# Patient Record
Sex: FEMALE | Race: WHITE | HISPANIC OR LATINO | Employment: UNEMPLOYED | URBAN - METROPOLITAN AREA
[De-identification: names, ages, dates, MRNs, and addresses within clinical notes are randomized per-mention and may not be internally consistent; named-entity substitution may affect disease eponyms.]

---

## 2024-06-19 ENCOUNTER — HOSPITAL ENCOUNTER (EMERGENCY)
Facility: HOSPITAL | Age: 24
Discharge: HOME | End: 2024-06-19
Attending: EMERGENCY MEDICINE
Payer: MEDICAID

## 2024-06-19 VITALS
RESPIRATION RATE: 18 BRPM | TEMPERATURE: 99 F | HEART RATE: 72 BPM | SYSTOLIC BLOOD PRESSURE: 126 MMHG | DIASTOLIC BLOOD PRESSURE: 77 MMHG | OXYGEN SATURATION: 100 %

## 2024-06-19 DIAGNOSIS — Z34.91 NORMAL IUP (INTRAUTERINE PREGNANCY) ON PRENATAL ULTRASOUND, FIRST TRIMESTER: Primary | ICD-10-CM

## 2024-06-19 DIAGNOSIS — R10.9 ABDOMINAL PAIN: ICD-10-CM

## 2024-06-19 LAB
ALBUMIN SERPL BCP-MCNC: 3.7 G/DL (ref 3.5–5.2)
ALP SERPL-CCNC: 100 U/L (ref 55–135)
ALT SERPL W/O P-5'-P-CCNC: 31 U/L (ref 10–44)
ANION GAP SERPL CALC-SCNC: 8 MMOL/L (ref 8–16)
AST SERPL-CCNC: 24 U/L (ref 10–40)
B-HCG UR QL: POSITIVE
BACTERIA #/AREA URNS HPF: ABNORMAL /HPF
BASOPHILS # BLD AUTO: 0.04 K/UL (ref 0–0.2)
BASOPHILS NFR BLD: 0.6 % (ref 0–1.9)
BILIRUB SERPL-MCNC: 0.7 MG/DL (ref 0.1–1)
BILIRUB UR QL STRIP: NEGATIVE
BUN SERPL-MCNC: 5 MG/DL (ref 6–20)
CALCIUM SERPL-MCNC: 9.5 MG/DL (ref 8.7–10.5)
CHLORIDE SERPL-SCNC: 106 MMOL/L (ref 95–110)
CLARITY UR: CLEAR
CO2 SERPL-SCNC: 21 MMOL/L (ref 23–29)
COLOR UR: YELLOW
CREAT SERPL-MCNC: 0.6 MG/DL (ref 0.5–1.4)
CTP QC/QA: YES
DIFFERENTIAL METHOD BLD: NORMAL
EOSINOPHIL # BLD AUTO: 0.1 K/UL (ref 0–0.5)
EOSINOPHIL NFR BLD: 1.3 % (ref 0–8)
ERYTHROCYTE [DISTWIDTH] IN BLOOD BY AUTOMATED COUNT: 12.7 % (ref 11.5–14.5)
EST. GFR  (NO RACE VARIABLE): >60 ML/MIN/1.73 M^2
GLUCOSE SERPL-MCNC: 81 MG/DL (ref 70–110)
GLUCOSE UR QL STRIP: NEGATIVE
HCT VFR BLD AUTO: 39.8 % (ref 37–48.5)
HGB BLD-MCNC: 14 G/DL (ref 12–16)
HGB UR QL STRIP: NEGATIVE
IMM GRANULOCYTES # BLD AUTO: 0.02 K/UL (ref 0–0.04)
IMM GRANULOCYTES NFR BLD AUTO: 0.3 % (ref 0–0.5)
KETONES UR QL STRIP: NEGATIVE
LEUKOCYTE ESTERASE UR QL STRIP: ABNORMAL
LIPASE SERPL-CCNC: 20 U/L (ref 4–60)
LYMPHOCYTES # BLD AUTO: 2.1 K/UL (ref 1–4.8)
LYMPHOCYTES NFR BLD: 31 % (ref 18–48)
MCH RBC QN AUTO: 30.5 PG (ref 27–31)
MCHC RBC AUTO-ENTMCNC: 35.2 G/DL (ref 32–36)
MCV RBC AUTO: 87 FL (ref 82–98)
MICROSCOPIC COMMENT: ABNORMAL
MONOCYTES # BLD AUTO: 0.4 K/UL (ref 0.3–1)
MONOCYTES NFR BLD: 6.6 % (ref 4–15)
NEUTROPHILS # BLD AUTO: 4 K/UL (ref 1.8–7.7)
NEUTROPHILS NFR BLD: 60.2 % (ref 38–73)
NITRITE UR QL STRIP: NEGATIVE
NRBC BLD-RTO: 0 /100 WBC
PH UR STRIP: 7 [PH] (ref 5–8)
PLATELET # BLD AUTO: 249 K/UL (ref 150–450)
PMV BLD AUTO: 9.6 FL (ref 9.2–12.9)
POTASSIUM SERPL-SCNC: 3.7 MMOL/L (ref 3.5–5.1)
PROT SERPL-MCNC: 7.6 G/DL (ref 6–8.4)
PROT UR QL STRIP: NEGATIVE
RBC # BLD AUTO: 4.59 M/UL (ref 4–5.4)
RBC #/AREA URNS HPF: 2 /HPF (ref 0–4)
SODIUM SERPL-SCNC: 135 MMOL/L (ref 136–145)
SP GR UR STRIP: 1.01 (ref 1–1.03)
SQUAMOUS #/AREA URNS HPF: 2 /HPF
URN SPEC COLLECT METH UR: ABNORMAL
UROBILINOGEN UR STRIP-ACNC: NEGATIVE EU/DL
WBC # BLD AUTO: 6.68 K/UL (ref 3.9–12.7)
WBC #/AREA URNS HPF: 9 /HPF (ref 0–5)

## 2024-06-19 PROCEDURE — 85025 COMPLETE CBC W/AUTO DIFF WBC: CPT

## 2024-06-19 PROCEDURE — 81000 URINALYSIS NONAUTO W/SCOPE: CPT

## 2024-06-19 PROCEDURE — 81025 URINE PREGNANCY TEST: CPT

## 2024-06-19 PROCEDURE — 99284 EMERGENCY DEPT VISIT MOD MDM: CPT | Mod: 25

## 2024-06-19 PROCEDURE — 83690 ASSAY OF LIPASE: CPT

## 2024-06-19 PROCEDURE — 80053 COMPREHEN METABOLIC PANEL: CPT

## 2024-06-19 NOTE — ED PROVIDER NOTES
Encounter Date: 2024       History     Chief Complaint   Patient presents with    Abdominal Pain     Patient c/o abdominal pain and nausea, states is pregnant - has not taken pregnancy test. No hx of pregancies.Nausea and pain started one week ago.     The patient is a 24-year-old female who came to the emergency department to see if she was pregnant.  She denies any complaints at this time but states she has some left lower quadrant pain from time to time.  No vaginal bleeding.  The patient is  and her last menstrual period was sometime in March.      Review of patient's allergies indicates:  No Known Allergies  History reviewed. No pertinent past medical history.  History reviewed. No pertinent surgical history.  No family history on file.     Review of Systems   All other systems reviewed and are negative.      Physical Exam     Initial Vitals   BP Pulse Resp Temp SpO2   24 1014 24 1014 24 1014 24 1019 24 1014   137/83 92 18 98.6 °F (37 °C) 100 %      MAP       --                Physical Exam    Nursing note and vitals reviewed.  Constitutional: She appears well-developed and well-nourished.   Neck: Neck supple.   Normal range of motion.  Abdominal: Abdomen is soft. She exhibits no distension. There is no abdominal tenderness.   Musculoskeletal:      Cervical back: Normal range of motion and neck supple.     Neurological: She is alert and oriented to person, place, and time.         ED Course   Procedures  Labs Reviewed   COMPREHENSIVE METABOLIC PANEL - Abnormal; Notable for the following components:       Result Value    Sodium 135 (*)     CO2 21 (*)     BUN 5 (*)     All other components within normal limits   URINALYSIS, REFLEX TO URINE CULTURE - Abnormal; Notable for the following components:    Leukocytes, UA 1+ (*)     All other components within normal limits    Narrative:     Specimen Source->Urine   URINALYSIS MICROSCOPIC - Abnormal; Notable for the following  components:    WBC, UA 9 (*)     All other components within normal limits    Narrative:     Specimen Source->Urine   POCT URINE PREGNANCY - Abnormal; Notable for the following components:    POC Preg Test, Ur Positive (*)     All other components within normal limits   CBC W/ AUTO DIFFERENTIAL   LIPASE          Imaging Results              US OB <14 Wks, TransAbd, Single Gestation (Final result)  Result time 06/19/24 13:02:20      Final result by Yasmani Reeder III, MD (06/19/24 13:02:20)                   Narrative:    EXAMINATION:  US OB <14 WEEKS TRANSABDOM, SINGLE GESTATION    CLINICAL HISTORY:  Unspecified abdominal pain    FINDINGS:  Uterus measures 12.4 x 4.9 x 9.1 cm.    Right ovary measures 2.5 x 1.5 x 1.9 cm.    Left ovary measures 3.6 x 1.8 x 2.6 cm.    Cervical length is 3.9 cm.    Mean sac diameter 54.6 mm.    Crown-rump length 69.0 mm.    Heart rate 144 beats per minute.    There is no yolk sac.    Placenta is posterior and low lying.    There is a single live intrauterine pregnancy 13 weeks 0 days without complication.  Delivery date is 12/25/2024.      Electronically signed by: Yasmani Reeder MD  Date:    06/19/2024  Time:    13:02                                     Medications - No data to display  Medical Decision Making  Differential Diagnosis includes, but is not limited to:  Pregnancy complication (threatened AB, inevitable AB, incomplete Ab, missed AB, ectopic pregnancy, ovarian cyst/torsion    MDM:  The patient is a 24-year-old female who has a positive pregnancy test today.  He denies any complaints at this time but states she has left lower quadrant abdominal pain from time to time. No vaginal bleeding.    Amount and/or Complexity of Data Reviewed  Labs: ordered.  Radiology: ordered. Decision-making details documented in ED Course.               ED Course as of 06/19/24 1606   Wed Jun 19, 2024   1311 US OB <14 Wks, TransAbd, Single Gestation  FINDINGS:  Uterus measures 12.4 x 4.9 x 9.1  cm.     Right ovary measures 2.5 x 1.5 x 1.9 cm.     Left ovary measures 3.6 x 1.8 x 2.6 cm.     Cervical length is 3.9 cm.     Mean sac diameter 54.6 mm.     Crown-rump length 69.0 mm.     Heart rate 144 beats per minute.     There is no yolk sac.     Placenta is posterior and low lying.     There is a single live intrauterine pregnancy 13 weeks 0 days without complication.  Delivery date is 12/25/2024.      [ST]      ED Course User Index  [ST] Devi Crawford MD                           Clinical Impression:  Final diagnoses:  [R10.9] Abdominal pain  [Z34.91] Normal IUP (intrauterine pregnancy) on prenatal ultrasound, first trimester (Primary)          ED Disposition Condition    Discharge Stable          ED Prescriptions    None       Follow-up Information    None          Devi Crawford MD  06/19/24 [id number]

## 2024-06-25 ENCOUNTER — OFFICE VISIT (OUTPATIENT)
Dept: OBSTETRICS AND GYNECOLOGY | Facility: CLINIC | Age: 24
End: 2024-06-25
Payer: MEDICAID

## 2024-06-25 ENCOUNTER — LAB VISIT (OUTPATIENT)
Dept: LAB | Facility: HOSPITAL | Age: 24
End: 2024-06-25
Attending: OBSTETRICS & GYNECOLOGY
Payer: MEDICAID

## 2024-06-25 DIAGNOSIS — Z34.91 NORMAL IUP (INTRAUTERINE PREGNANCY) ON PRENATAL ULTRASOUND, FIRST TRIMESTER: ICD-10-CM

## 2024-06-25 LAB
ABO + RH BLD: NORMAL
BASOPHILS # BLD AUTO: 0.03 K/UL (ref 0–0.2)
BASOPHILS NFR BLD: 0.4 % (ref 0–1.9)
BLD GP AB SCN CELLS X3 SERPL QL: NORMAL
DIFFERENTIAL METHOD BLD: NORMAL
EOSINOPHIL # BLD AUTO: 0.1 K/UL (ref 0–0.5)
EOSINOPHIL NFR BLD: 1.5 % (ref 0–8)
ERYTHROCYTE [DISTWIDTH] IN BLOOD BY AUTOMATED COUNT: 12.9 % (ref 11.5–14.5)
HBV SURFACE AG SERPL QL IA: NORMAL
HCT VFR BLD AUTO: 39.1 % (ref 37–48.5)
HCV AB SERPL QL IA: NORMAL
HGB BLD-MCNC: 13.8 G/DL (ref 12–16)
HIV 1+2 AB+HIV1 P24 AG SERPL QL IA: NORMAL
IMM GRANULOCYTES # BLD AUTO: 0.03 K/UL (ref 0–0.04)
IMM GRANULOCYTES NFR BLD AUTO: 0.4 % (ref 0–0.5)
LYMPHOCYTES # BLD AUTO: 2.1 K/UL (ref 1–4.8)
LYMPHOCYTES NFR BLD: 30.4 % (ref 18–48)
MCH RBC QN AUTO: 30.5 PG (ref 27–31)
MCHC RBC AUTO-ENTMCNC: 35.3 G/DL (ref 32–36)
MCV RBC AUTO: 86 FL (ref 82–98)
MONOCYTES # BLD AUTO: 0.4 K/UL (ref 0.3–1)
MONOCYTES NFR BLD: 6.2 % (ref 4–15)
NEUTROPHILS # BLD AUTO: 4.1 K/UL (ref 1.8–7.7)
NEUTROPHILS NFR BLD: 61.1 % (ref 38–73)
NRBC BLD-RTO: 0 /100 WBC
PLATELET # BLD AUTO: 246 K/UL (ref 150–450)
PMV BLD AUTO: 9.8 FL (ref 9.2–12.9)
RBC # BLD AUTO: 4.53 M/UL (ref 4–5.4)
TREPONEMA PALLIDUM IGG+IGM AB [PRESENCE] IN SERUM OR PLASMA BY IMMUNOASSAY: NONREACTIVE
WBC # BLD AUTO: 6.77 K/UL (ref 3.9–12.7)

## 2024-06-25 PROCEDURE — 86593 SYPHILIS TEST NON-TREP QUANT: CPT | Performed by: OBSTETRICS & GYNECOLOGY

## 2024-06-25 PROCEDURE — 86900 BLOOD TYPING SEROLOGIC ABO: CPT | Performed by: OBSTETRICS & GYNECOLOGY

## 2024-06-25 PROCEDURE — 85025 COMPLETE CBC W/AUTO DIFF WBC: CPT | Performed by: OBSTETRICS & GYNECOLOGY

## 2024-06-25 PROCEDURE — 86850 RBC ANTIBODY SCREEN: CPT | Performed by: OBSTETRICS & GYNECOLOGY

## 2024-06-25 PROCEDURE — 99999 PR PBB SHADOW E&M-EST. PATIENT-LVL II: CPT | Mod: PBBFAC,,, | Performed by: OBSTETRICS & GYNECOLOGY

## 2024-06-25 PROCEDURE — 86803 HEPATITIS C AB TEST: CPT | Performed by: OBSTETRICS & GYNECOLOGY

## 2024-06-25 PROCEDURE — 86762 RUBELLA ANTIBODY: CPT | Performed by: OBSTETRICS & GYNECOLOGY

## 2024-06-25 PROCEDURE — 88175 CYTOPATH C/V AUTO FLUID REDO: CPT | Performed by: OBSTETRICS & GYNECOLOGY

## 2024-06-25 PROCEDURE — 87389 HIV-1 AG W/HIV-1&-2 AB AG IA: CPT | Performed by: OBSTETRICS & GYNECOLOGY

## 2024-06-25 PROCEDURE — 36415 COLL VENOUS BLD VENIPUNCTURE: CPT | Performed by: OBSTETRICS & GYNECOLOGY

## 2024-06-25 PROCEDURE — 99212 OFFICE O/P EST SF 10 MIN: CPT | Mod: PBBFAC,TH,PO | Performed by: OBSTETRICS & GYNECOLOGY

## 2024-06-25 PROCEDURE — 87086 URINE CULTURE/COLONY COUNT: CPT | Performed by: OBSTETRICS & GYNECOLOGY

## 2024-06-25 PROCEDURE — 87491 CHLMYD TRACH DNA AMP PROBE: CPT | Performed by: OBSTETRICS & GYNECOLOGY

## 2024-06-25 PROCEDURE — 81514 NFCT DS BV&VAGINITIS DNA ALG: CPT | Performed by: OBSTETRICS & GYNECOLOGY

## 2024-06-25 PROCEDURE — 87340 HEPATITIS B SURFACE AG IA: CPT | Performed by: OBSTETRICS & GYNECOLOGY

## 2024-06-25 NOTE — PROGRESS NOTES
CC: 13 wk pregnancy    SUBJECTIVE:   24 y.o. female No obstetric history on file.  for annual routine Pap and checkup. No LMP recorded..  She has no unusual complaints.        History reviewed. No pertinent past medical history.  History reviewed. No pertinent surgical history.  Social History     Socioeconomic History    Marital status: Single   Tobacco Use    Smoking status: Never    Smokeless tobacco: Never     No family history on file.  OB History   No obstetric history on file.         No current outpatient medications on file.     No current facility-administered medications for this visit.     Allergies: Patient has no known allergies.     ROS:  Constitutional: no weight loss, weight gain, fever, fatigue  Eyes:  No vision changes, glasses/contacts  ENT/Mouth: No ulcers, sinus problems, ears ringing, headache  Cardiovascular: No inability to lie flat, chest pain, exercise intolerance, swelling, heart palpitations  Respiratory: No wheezing, coughing blood, shortness of breath, or cough  Gastrointestinal: No diarrhea, bloody stool, nausea/vomiting, constipation, gas, hemorrhoids  Genitourinary: No blood in urine, painful urination, urgency of urination, frequency of urination, incomplete emptying, incontinence, abnormal bleeding, painful periods, heavy periods, vaginal discharge, vaginal odor, painful intercourse, sexual problems, bleeding after intercourse.  Musculoskeletal: No muscle weakness  Skin/Breast: No painful breasts, nipple discharge, masses, rash, ulcers  Neurological: No passing out, seizures, numbness, headache  Endocrine: No diabetes, hypothyroid, hyperthyroid, hot flashes, hair loss, abnormal hair growth, ance  Psychiatric: No depression, crying  Hematologic: No bruises, bleeding, swollen lymph nodes, anemia.      OBJECTIVE:   The patient appears well, alert, oriented x 3, in no distress.  There were no vitals taken for this visit.  NECK: no thyromegaly, trachea midline  SKIN: no acne, striae,  hirsutism  BREAST EXAM: not examined  ABDOMEN: no hernias, masses, or hepatosplenomegaly  GENITALIA: normal external genitalia, no erythema, no discharge  URETHRA: normal urethra, normal urethral meatus  VAGINA: Normal  CERVIX: no lesions or cervical motion tenderness  UTERUS: enlarged, 14 weeks size  ADNEXA: normal adnexa and no mass, fullness, tenderness  US OB <14 WEEKS TRANSABDOM, SINGLE GESTATION     CLINICAL HISTORY:  Unspecified abdominal pain     FINDINGS:  Uterus measures 12.4 x 4.9 x 9.1 cm.     Right ovary measures 2.5 x 1.5 x 1.9 cm.     Left ovary measures 3.6 x 1.8 x 2.6 cm.     Cervical length is 3.9 cm.     Mean sac diameter 54.6 mm.     Crown-rump length 69.0 mm.     Heart rate 144 beats per minute.     There is no yolk sac.     Placenta is posterior and low lying.     There is a single live intrauterine pregnancy 13 weeks 0 days without complication.  Delivery date is 12/25/2024.        Electronically signed by:Yasmani Reeder MD  Date:                                            06/19/2024  Time:                                           13:02    ASSESSMENT:   well woman  1. Normal IUP (intrauterine pregnancy) on prenatal ultrasound, first trimester        PLAN:   pap smear  additional lab tests per orders  return 3wks for quad  Orders Placed This Encounter    Urine culture    Rubella Antibody, IgG    Hepatitis C Antibody    HIV 1/2 Ag/Ab (4th Gen)    Hepatitis B Surface Antigen    CBC Auto Differential    Treponema Pallidium Antibodies IgG, IgM    Vaginosis Screen by DNA Probe    C. trachomatis/N. gonorrhoeae by AMP DNA [facility]; Cervix    POCT urine pregnancy    Type & Screen - Ob Profile    Liquid-Based Pap Smear, Screening

## 2024-06-26 LAB
C TRACH DNA SPEC QL NAA+PROBE: NOT DETECTED
CLINICAL INFO: NORMAL
DATE OF PREVIOUS PAP: NORMAL
DATE PREVIOUS BX: NORMAL
LMP START DATE: NORMAL
N GONORRHOEA DNA SPEC QL NAA+PROBE: NOT DETECTED
RUBV IGG SER-ACNC: <5 IU/ML
RUBV IGG SER-IMP: ABNORMAL
SPECIMEN SOURCE CVX/VAG CYTO: NORMAL

## 2024-06-27 LAB
BACTERIA UR CULT: NORMAL
BACTERIA UR CULT: NORMAL
BACTERIAL VAGINOSIS DNA: NEGATIVE
CANDIDA GLABRATA DNA: NEGATIVE
CANDIDA KRUSEI DNA: NEGATIVE
CANDIDA RRNA VAG QL PROBE: NEGATIVE
T VAGINALIS RRNA GENITAL QL PROBE: NEGATIVE

## 2024-07-23 ENCOUNTER — TELEPHONE (OUTPATIENT)
Dept: OBSTETRICS AND GYNECOLOGY | Facility: CLINIC | Age: 24
End: 2024-07-23
Payer: MEDICAID

## 2024-07-23 ENCOUNTER — ROUTINE PRENATAL (OUTPATIENT)
Dept: OBSTETRICS AND GYNECOLOGY | Facility: CLINIC | Age: 24
End: 2024-07-23
Payer: MEDICAID

## 2024-07-23 VITALS — WEIGHT: 116.88 LBS | DIASTOLIC BLOOD PRESSURE: 76 MMHG | SYSTOLIC BLOOD PRESSURE: 110 MMHG

## 2024-07-23 DIAGNOSIS — Z3A.17 17 WEEKS GESTATION OF PREGNANCY: Primary | ICD-10-CM

## 2024-07-23 PROCEDURE — 99211 OFF/OP EST MAY X REQ PHY/QHP: CPT | Mod: PBBFAC,PO | Performed by: OBSTETRICS & GYNECOLOGY

## 2024-07-23 PROCEDURE — 99999 PR PBB SHADOW E&M-EST. PATIENT-LVL I: CPT | Mod: PBBFAC,,, | Performed by: OBSTETRICS & GYNECOLOGY

## 2024-07-23 NOTE — PROGRESS NOTES
CC: Routine OB    SUBJECTIVE:   24 y.o. female   for routine OB. No LMP recorded. Patient is pregnant..  She has no unusual complaints. Denies vaginal bleeding or cramping.    FHT 140s      History reviewed. No pertinent past medical history.  History reviewed. No pertinent surgical history.  Social History     Socioeconomic History    Marital status: Single   Tobacco Use    Smoking status: Never    Smokeless tobacco: Never   Substance and Sexual Activity    Alcohol use: Not Currently    Drug use: Not Currently    Sexual activity: Yes     Partners: Male     No family history on file.  OB History    Para Term  AB Living   1             SAB IAB Ectopic Multiple Live Births                  # Outcome Date GA Lbr Roney/2nd Weight Sex Type Anes PTL Lv   1 Current                  No current outpatient medications on file.     No current facility-administered medications for this visit.     Allergies: Patient has no known allergies.     ROS:  Constitutional: no weight loss, weight gain, fever, fatigue  Eyes:  No vision changes, glasses/contacts  ENT/Mouth: No ulcers, sinus problems, ears ringing, headache  Cardiovascular: No inability to lie flat, chest pain, exercise intolerance, swelling, heart palpitations  Respiratory: No wheezing, coughing blood, shortness of breath, or cough  Gastrointestinal: No diarrhea, bloody stool, nausea/vomiting, constipation, gas, hemorrhoids  Genitourinary: No blood in urine, painful urination, urgency of urination, frequency of urination, incomplete emptying, incontinence, abnormal bleeding, painful periods, heavy periods, vaginal discharge, vaginal odor, painful intercourse, sexual problems, bleeding after intercourse.  Musculoskeletal: No muscle weakness  Skin/Breast: No painful breasts, nipple discharge, masses, rash, ulcers  Neurological: No passing out, seizures, numbness, headache  Endocrine: No diabetes, hypothyroid, hyperthyroid, hot flashes, hair loss, abnormal  hair growth, ance  Psychiatric: No depression, crying  Hematologic: No bruises, bleeding, swollen lymph nodes, anemia.      OBJECTIVE:   The patient appears well, alert, oriented x 3, in no distress.  /76   Wt 53 kg (116 lb 13.5 oz)   Physical Exam  Constitutional:       General: She is not in acute distress.     Appearance: Normal appearance. She is not ill-appearing.   HENT:      Head: Normocephalic and atraumatic.   Eyes:      General:         Right eye: No discharge.         Left eye: No discharge.      Conjunctiva/sclera: Conjunctivae normal.   Cardiovascular:      Rate and Rhythm: Normal rate.      Pulses: Normal pulses.   Pulmonary:      Effort: Pulmonary effort is normal. No respiratory distress.   Abdominal:      Tenderness: There is no abdominal tenderness. There is no guarding or rebound.      Comments: Gravid abdomen   Musculoskeletal:      Right lower leg: No edema.      Left lower leg: No edema.   Skin:     General: Skin is warm.      Coloration: Skin is not jaundiced.   Neurological:      Mental Status: She is alert and oriented to person, place, and time.      Gait: Gait normal.   Psychiatric:         Mood and Affect: Mood normal.         Behavior: Behavior normal.             ASSESSMENT:   1. 17 weeks gestation of pregnancy        PLAN:   Anatomy scan & maternal quad ordered. RTC in 4 weeks  Vitals signs, FHTs, urine dip, and PE findings documented, reviewed and available in OB flow chart.   Orders Placed This Encounter    Maternal Quad Screen    US OB/GYN Procedure ([facility])-Future

## 2024-08-06 ENCOUNTER — PROCEDURE VISIT (OUTPATIENT)
Dept: MATERNAL FETAL MEDICINE | Facility: CLINIC | Age: 24
End: 2024-08-06
Payer: MEDICAID

## 2024-08-06 DIAGNOSIS — Z3A.17 17 WEEKS GESTATION OF PREGNANCY: ICD-10-CM

## 2024-08-06 PROCEDURE — 76805 OB US >/= 14 WKS SNGL FETUS: CPT | Mod: PBBFAC,PO | Performed by: OBSTETRICS & GYNECOLOGY

## 2024-08-20 ENCOUNTER — ROUTINE PRENATAL (OUTPATIENT)
Dept: OBSTETRICS AND GYNECOLOGY | Facility: CLINIC | Age: 24
End: 2024-08-20
Payer: MEDICAID

## 2024-08-20 VITALS — SYSTOLIC BLOOD PRESSURE: 112 MMHG | WEIGHT: 121.06 LBS | DIASTOLIC BLOOD PRESSURE: 74 MMHG

## 2024-08-20 DIAGNOSIS — Z3A.21 21 WEEKS GESTATION OF PREGNANCY: Primary | ICD-10-CM

## 2024-08-20 PROCEDURE — 99999 PR PBB SHADOW E&M-EST. PATIENT-LVL II: CPT | Mod: PBBFAC,,, | Performed by: OBSTETRICS & GYNECOLOGY

## 2024-08-20 PROCEDURE — 99212 OFFICE O/P EST SF 10 MIN: CPT | Mod: PBBFAC,PO | Performed by: OBSTETRICS & GYNECOLOGY

## 2024-08-20 NOTE — PROGRESS NOTES
Patient with no complaints. Denies vaginal bleeding or cramping.  Good FM. FHTs 130's. Discussed with patient having glucose testing for gestational diabetes preformed between 24-28 weeks. RTC in 4 weeks.   Consents signed today with   Vitals signs, FHTs, urine dip, and PE findings documented, reviewed and available in OB flow chart.       I spent a total of 20 minutes on the day of the visit.This includes face to face time and non-face to face time preparing to see the patient (eg, review of tests), Obtaining and/or reviewing separately obtained history, Documenting clinical information in the electronic or other health record, Independently interpreting resultsand communicating results to the patient/family/caregiver, or Care coordination.

## 2024-09-24 ENCOUNTER — ROUTINE PRENATAL (OUTPATIENT)
Dept: OBSTETRICS AND GYNECOLOGY | Facility: CLINIC | Age: 24
End: 2024-09-24
Payer: MEDICAID

## 2024-09-24 VITALS — DIASTOLIC BLOOD PRESSURE: 71 MMHG | WEIGHT: 124.56 LBS | SYSTOLIC BLOOD PRESSURE: 111 MMHG

## 2024-09-24 DIAGNOSIS — Z3A.26 26 WEEKS GESTATION OF PREGNANCY: Primary | ICD-10-CM

## 2024-09-24 PROCEDURE — 99999 PR PBB SHADOW E&M-EST. PATIENT-LVL II: CPT | Mod: PBBFAC,,, | Performed by: OBSTETRICS & GYNECOLOGY

## 2024-09-24 PROCEDURE — 99212 OFFICE O/P EST SF 10 MIN: CPT | Mod: PBBFAC,PO | Performed by: OBSTETRICS & GYNECOLOGY

## 2024-09-24 PROCEDURE — 99213 OFFICE O/P EST LOW 20 MIN: CPT | Mod: TH,S$PBB,, | Performed by: OBSTETRICS & GYNECOLOGY

## 2024-09-24 NOTE — PROGRESS NOTES
Patient with no complaints. Denies vaginal bleeding or contractions. Good FM. GTT/ ordered today.   labor precautions discussed with patient. RTC in 2 weeks.   Fht's 140-50s  Vitals signs, FHTs, urine dip, and PE findings documented, reviewed and available in OB flow chart.       I spent a total of 20 minutes on the day of the visit.This includes face to face time and non-face to face time preparing to see the patient (eg, review of tests), Obtaining and/or reviewing separately obtained history, Documenting clinical information in the electronic or other health record, Independently interpreting resultsand communicating results to the patient/family/caregiver, or Care coordination.

## 2024-10-22 ENCOUNTER — LAB VISIT (OUTPATIENT)
Dept: LAB | Facility: HOSPITAL | Age: 24
End: 2024-10-22
Attending: OBSTETRICS & GYNECOLOGY
Payer: MEDICAID

## 2024-10-22 ENCOUNTER — ROUTINE PRENATAL (OUTPATIENT)
Dept: OBSTETRICS AND GYNECOLOGY | Facility: CLINIC | Age: 24
End: 2024-10-22
Payer: MEDICAID

## 2024-10-22 VITALS — DIASTOLIC BLOOD PRESSURE: 72 MMHG | SYSTOLIC BLOOD PRESSURE: 118 MMHG | WEIGHT: 127 LBS

## 2024-10-22 DIAGNOSIS — Z3A.30 30 WEEKS GESTATION OF PREGNANCY: ICD-10-CM

## 2024-10-22 DIAGNOSIS — Z3A.30 30 WEEKS GESTATION OF PREGNANCY: Primary | ICD-10-CM

## 2024-10-22 DIAGNOSIS — Z3A.26 26 WEEKS GESTATION OF PREGNANCY: ICD-10-CM

## 2024-10-22 LAB — GLUCOSE SERPL-MCNC: 117 MG/DL (ref 70–140)

## 2024-10-22 PROCEDURE — 99212 OFFICE O/P EST SF 10 MIN: CPT | Mod: PBBFAC,PO | Performed by: OBSTETRICS & GYNECOLOGY

## 2024-10-22 PROCEDURE — 82950 GLUCOSE TEST: CPT | Performed by: OBSTETRICS & GYNECOLOGY

## 2024-10-22 PROCEDURE — 36415 COLL VENOUS BLD VENIPUNCTURE: CPT | Performed by: OBSTETRICS & GYNECOLOGY

## 2024-10-22 PROCEDURE — 86593 SYPHILIS TEST NON-TREP QUANT: CPT | Performed by: OBSTETRICS & GYNECOLOGY

## 2024-10-22 PROCEDURE — 87389 HIV-1 AG W/HIV-1&-2 AB AG IA: CPT | Performed by: OBSTETRICS & GYNECOLOGY

## 2024-10-22 PROCEDURE — 99999 PR PBB SHADOW E&M-EST. PATIENT-LVL II: CPT | Mod: PBBFAC,,, | Performed by: OBSTETRICS & GYNECOLOGY

## 2024-10-22 NOTE — PROGRESS NOTES
Patient with lower abd pains worse with mvmt Denies vaginal bleeding or contractions. Good FM. RTC in 4 weeks.   Cx closed  Discussed abd binder for RLP  Needs to do dm screen and T3  labs today  Will walk pt to lab to get test done  Vitals signs, FHTs, urine dip, and PE findings documented, reviewed and available in OB flow chart.       I spent a total of 20 minutes on the day of the visit.This includes face to face time and non-face to face time preparing to see the patient (eg, review of tests), Obtaining and/or reviewing separately obtained history, Documenting clinical information in the electronic or other health record, Independently interpreting resultsand communicating results to the patient/family/caregiver, or Care coordination.

## 2024-10-23 LAB
HIV 1+2 AB+HIV1 P24 AG SERPL QL IA: NORMAL
TREPONEMA PALLIDUM IGG+IGM AB [PRESENCE] IN SERUM OR PLASMA BY IMMUNOASSAY: NONREACTIVE

## 2024-11-19 ENCOUNTER — ROUTINE PRENATAL (OUTPATIENT)
Dept: OBSTETRICS AND GYNECOLOGY | Facility: CLINIC | Age: 24
End: 2024-11-19
Payer: COMMERCIAL

## 2024-11-19 VITALS — SYSTOLIC BLOOD PRESSURE: 129 MMHG | DIASTOLIC BLOOD PRESSURE: 77 MMHG | WEIGHT: 132.69 LBS

## 2024-11-19 DIAGNOSIS — Z3A.34 34 WEEKS GESTATION OF PREGNANCY: Primary | ICD-10-CM

## 2024-11-19 PROCEDURE — 99999 PR PBB SHADOW E&M-EST. PATIENT-LVL II: CPT | Mod: PBBFAC,,, | Performed by: OBSTETRICS & GYNECOLOGY

## 2024-11-19 NOTE — PROGRESS NOTES
Patient with no complaints. Denies vaginal bleeding or contractions. Good FM. RTC in 2 weeks.   Fht's 140's  Consents done  Vitals signs, FHTs, urine dip, and PE findings documented, reviewed and available in OB flow chart.       I spent a total of 20 minutes on the day of the visit.This includes face to face time and non-face to face time preparing to see the patient (eg, review of tests), Obtaining and/or reviewing separately obtained history, Documenting clinical information in the electronic or other health record, Independently interpreting resultsand communicating results to the patient/family/caregiver, or Care coordination.

## 2024-11-26 ENCOUNTER — ROUTINE PRENATAL (OUTPATIENT)
Dept: OBSTETRICS AND GYNECOLOGY | Facility: CLINIC | Age: 24
End: 2024-11-26
Payer: MEDICAID

## 2024-11-26 VITALS — DIASTOLIC BLOOD PRESSURE: 88 MMHG | SYSTOLIC BLOOD PRESSURE: 130 MMHG | WEIGHT: 135.81 LBS

## 2024-11-26 DIAGNOSIS — Z3A.35 35 WEEKS GESTATION OF PREGNANCY: Primary | ICD-10-CM

## 2024-11-26 PROCEDURE — 99999 PR PBB SHADOW E&M-EST. PATIENT-LVL II: CPT | Mod: PBBFAC,,, | Performed by: OBSTETRICS & GYNECOLOGY

## 2024-11-26 PROCEDURE — 87653 STREP B DNA AMP PROBE: CPT | Performed by: OBSTETRICS & GYNECOLOGY

## 2024-11-26 PROCEDURE — 99212 OFFICE O/P EST SF 10 MIN: CPT | Mod: PBBFAC,PO | Performed by: OBSTETRICS & GYNECOLOGY

## 2024-11-26 NOTE — PROGRESS NOTES
Patient with no complaints. Denies vaginal bleeding or contractions. Good FM. GBS collected today. Labor precautions discused with patient. RTC in 1 week.   Fht's 130-40's  Cx closed 50 -3  Vitals signs, FHTs, urine dip, and PE findings documented, reviewed and available in OB flow chart.       I spent a total of 20 minutes on the day of the visit.This includes face to face time and non-face to face time preparing to see the patient (eg, review of tests), Obtaining and/or reviewing separately obtained history, Documenting clinical information in the electronic or other health record, Independently interpreting resultsand communicating results to the patient/family/caregiver, or Care coordination.

## 2024-11-28 LAB — GROUP B STREPTOCOCCUS, PCR: NEGATIVE

## 2024-12-03 ENCOUNTER — ROUTINE PRENATAL (OUTPATIENT)
Dept: OBSTETRICS AND GYNECOLOGY | Facility: CLINIC | Age: 24
End: 2024-12-03
Payer: MEDICAID

## 2024-12-03 VITALS — SYSTOLIC BLOOD PRESSURE: 128 MMHG | WEIGHT: 137.56 LBS | DIASTOLIC BLOOD PRESSURE: 86 MMHG

## 2024-12-03 DIAGNOSIS — Z3A.36 36 WEEKS GESTATION OF PREGNANCY: Primary | ICD-10-CM

## 2024-12-03 PROCEDURE — 99212 OFFICE O/P EST SF 10 MIN: CPT | Mod: PBBFAC,PO | Performed by: OBSTETRICS & GYNECOLOGY

## 2024-12-03 PROCEDURE — 99999 PR PBB SHADOW E&M-EST. PATIENT-LVL II: CPT | Mod: PBBFAC,,, | Performed by: OBSTETRICS & GYNECOLOGY

## 2024-12-03 NOTE — PROGRESS NOTES
Patient with no complaints. Denies vaginal bleeding or contractions. Good FM. GBS negative . Labor precautions discused with patient. RTC in 1 week.   Fht's 140's  Cx LTC  Vitals signs, FHTs, urine dip, and PE findings documented, reviewed and available in OB flow chart.       I spent a total of 20 minutes on the day of the visit.This includes face to face time and non-face to face time preparing to see the patient (eg, review of tests), Obtaining and/or reviewing separately obtained history, Documenting clinical information in the electronic or other health record, Independently interpreting resultsand communicating results to the patient/family/caregiver, or Care coordination.

## 2024-12-10 ENCOUNTER — ROUTINE PRENATAL (OUTPATIENT)
Dept: OBSTETRICS AND GYNECOLOGY | Facility: CLINIC | Age: 24
End: 2024-12-10
Payer: MEDICAID

## 2024-12-10 VITALS — WEIGHT: 138.25 LBS | DIASTOLIC BLOOD PRESSURE: 82 MMHG | SYSTOLIC BLOOD PRESSURE: 135 MMHG

## 2024-12-10 DIAGNOSIS — Z3A.37 37 WEEKS GESTATION OF PREGNANCY: Primary | ICD-10-CM

## 2024-12-10 PROCEDURE — 99212 OFFICE O/P EST SF 10 MIN: CPT | Mod: PBBFAC,PO | Performed by: OBSTETRICS & GYNECOLOGY

## 2024-12-10 PROCEDURE — 99999 PR PBB SHADOW E&M-EST. PATIENT-LVL II: CPT | Mod: PBBFAC,,, | Performed by: OBSTETRICS & GYNECOLOGY

## 2024-12-10 PROCEDURE — 84156 ASSAY OF PROTEIN URINE: CPT | Performed by: OBSTETRICS & GYNECOLOGY

## 2024-12-10 NOTE — PROGRESS NOTES
Patient with no complaints. Denies vaginal bleeding or contractions. Good FM. RTC in 1 week.   Fht's 130-40's  Cx LTC  P/c sent  Labor, rom, vb. Pre e precautions, FMC bid  Vitals signs, FHTs, urine dip, and PE findings documented, reviewed and available in OB flow chart.       I spent a total of 20 minutes on the day of the visit.This includes face to face time and non-face to face time preparing to see the patient (eg, review of tests), Obtaining and/or reviewing separately obtained history, Documenting clinical information in the electronic or other health record, Independently interpreting resultsand communicating results to the patient/family/caregiver, or Care coordination.

## 2024-12-11 ENCOUNTER — TELEPHONE (OUTPATIENT)
Dept: OBSTETRICS AND GYNECOLOGY | Facility: HOSPITAL | Age: 24
End: 2024-12-11
Payer: MEDICAID

## 2024-12-11 LAB
CREAT UR-MCNC: 48 MG/DL (ref 15–325)
PROT UR-MCNC: 37 MG/DL (ref 0–15)
PROT/CREAT UR: 0.77 MG/G{CREAT} (ref 0–0.2)

## 2024-12-11 NOTE — PROGRESS NOTES
Pt called via  [facility] #[id number] , pt called at 143pm and advised to come to labor and delivery for further evaluation, due to previous lab values. PCR of 0.7.  Pt told she could possible stay for delivery depending on evaluation and results. Pt states understanding to  and will come to labor and delivery nancy

## 2024-12-12 ENCOUNTER — TELEPHONE (OUTPATIENT)
Dept: OBSTETRICS AND GYNECOLOGY | Facility: CLINIC | Age: 24
End: 2024-12-12
Payer: MEDICAID

## 2024-12-12 NOTE — TELEPHONE ENCOUNTER
Called pt 2x with  #[phone] recording picked up both times. Couldn't leave voicemail because voicemail not set up

## 2024-12-12 NOTE — TELEPHONE ENCOUNTER
Please have pt report to L&D asap for BP series and evaluation for pre eclampsia  L&D spoke to pt yesterday and tried to get her in but she never showed up  This is urgent

## 2024-12-12 NOTE — TELEPHONE ENCOUNTER
Have been trying to contact pt w/o success to go to L&D for eval for pre e  Nurse spoke to pt via  yesterday but pt never came to L&D as instructed.  Instruvcted staff on L&D and clinic to cont to try reaching pt to go to L&D

## 2024-12-13 ENCOUNTER — TELEPHONE (OUTPATIENT)
Dept: OBSTETRICS AND GYNECOLOGY | Facility: CLINIC | Age: 24
End: 2024-12-13
Payer: MEDICAID

## 2024-12-13 ENCOUNTER — ANESTHESIA EVENT (OUTPATIENT)
Dept: OBSTETRICS AND GYNECOLOGY | Facility: HOSPITAL | Age: 24
End: 2024-12-13
Payer: MEDICAID

## 2024-12-13 ENCOUNTER — HOSPITAL ENCOUNTER (INPATIENT)
Facility: HOSPITAL | Age: 24
LOS: 4 days | Discharge: HOME | End: 2024-12-17
Attending: OBSTETRICS & GYNECOLOGY | Admitting: OBSTETRICS & GYNECOLOGY
Payer: MEDICAID

## 2024-12-13 ENCOUNTER — ANESTHESIA (OUTPATIENT)
Dept: OBSTETRICS AND GYNECOLOGY | Facility: HOSPITAL | Age: 24
End: 2024-12-13
Payer: MEDICAID

## 2024-12-13 DIAGNOSIS — O14.93 PRE-ECLAMPSIA IN THIRD TRIMESTER: Primary | ICD-10-CM

## 2024-12-13 DIAGNOSIS — Z34.90 PREGNANCY: ICD-10-CM

## 2024-12-13 LAB
ABO GROUP BLD: NORMAL
ALBUMIN SERPL BCP-MCNC: 2.5 G/DL (ref 3.5–5.2)
ALP SERPL-CCNC: 428 U/L (ref 40–150)
ALT SERPL W/O P-5'-P-CCNC: 57 U/L (ref 10–44)
ANION GAP SERPL CALC-SCNC: 9 MMOL/L (ref 8–16)
AST SERPL-CCNC: 50 U/L (ref 10–40)
BASOPHILS # BLD AUTO: 0.04 K/UL (ref 0–0.2)
BASOPHILS # BLD AUTO: 0.04 K/UL (ref 0–0.2)
BASOPHILS NFR BLD: 0.8 % (ref 0–1.9)
BASOPHILS NFR BLD: 0.9 % (ref 0–1.9)
BILIRUB SERPL-MCNC: 0.7 MG/DL (ref 0.1–1)
BLD GP AB SCN CELLS X3 SERPL QL: NORMAL
BUN SERPL-MCNC: 8 MG/DL (ref 6–20)
CALCIUM SERPL-MCNC: 9 MG/DL (ref 8.7–10.5)
CHLORIDE SERPL-SCNC: 110 MMOL/L (ref 95–110)
CO2 SERPL-SCNC: 19 MMOL/L (ref 23–29)
CREAT SERPL-MCNC: 0.7 MG/DL (ref 0.5–1.4)
CREAT UR-MCNC: 14.6 MG/DL (ref 15–325)
DIFFERENTIAL METHOD BLD: ABNORMAL
DIFFERENTIAL METHOD BLD: ABNORMAL
EOSINOPHIL # BLD AUTO: 0 K/UL (ref 0–0.5)
EOSINOPHIL # BLD AUTO: 0 K/UL (ref 0–0.5)
EOSINOPHIL NFR BLD: 0.6 % (ref 0–8)
EOSINOPHIL NFR BLD: 0.9 % (ref 0–8)
ERYTHROCYTE [DISTWIDTH] IN BLOOD BY AUTOMATED COUNT: 15.4 % (ref 11.5–14.5)
ERYTHROCYTE [DISTWIDTH] IN BLOOD BY AUTOMATED COUNT: 15.5 % (ref 11.5–14.5)
EST. GFR  (NO RACE VARIABLE): >60 ML/MIN/1.73 M^2
GLUCOSE SERPL-MCNC: 110 MG/DL (ref 70–110)
HBV SURFACE AG SERPL QL IA: NORMAL
HCT VFR BLD AUTO: 35.4 % (ref 37–48.5)
HCT VFR BLD AUTO: 36.6 % (ref 37–48.5)
HGB BLD-MCNC: 11.5 G/DL (ref 12–16)
HGB BLD-MCNC: 11.9 G/DL (ref 12–16)
HIV 1+2 AB+HIV1 P24 AG SERPL QL IA: NORMAL
IMM GRANULOCYTES # BLD AUTO: 0.02 K/UL (ref 0–0.04)
IMM GRANULOCYTES # BLD AUTO: 0.03 K/UL (ref 0–0.04)
IMM GRANULOCYTES NFR BLD AUTO: 0.4 % (ref 0–0.5)
IMM GRANULOCYTES NFR BLD AUTO: 0.6 % (ref 0–0.5)
LYMPHOCYTES # BLD AUTO: 1.6 K/UL (ref 1–4.8)
LYMPHOCYTES # BLD AUTO: 1.7 K/UL (ref 1–4.8)
LYMPHOCYTES NFR BLD: 36.2 % (ref 18–48)
LYMPHOCYTES NFR BLD: 36.4 % (ref 18–48)
MCH RBC QN AUTO: 26.6 PG (ref 27–31)
MCH RBC QN AUTO: 26.7 PG (ref 27–31)
MCHC RBC AUTO-ENTMCNC: 32.5 G/DL (ref 32–36)
MCHC RBC AUTO-ENTMCNC: 32.5 G/DL (ref 32–36)
MCV RBC AUTO: 82 FL (ref 82–98)
MCV RBC AUTO: 82 FL (ref 82–98)
MONOCYTES # BLD AUTO: 0.3 K/UL (ref 0.3–1)
MONOCYTES # BLD AUTO: 0.3 K/UL (ref 0.3–1)
MONOCYTES NFR BLD: 6.9 % (ref 4–15)
MONOCYTES NFR BLD: 7.2 % (ref 4–15)
NEUTROPHILS # BLD AUTO: 2.5 K/UL (ref 1.8–7.7)
NEUTROPHILS # BLD AUTO: 2.6 K/UL (ref 1.8–7.7)
NEUTROPHILS NFR BLD: 54.4 % (ref 38–73)
NEUTROPHILS NFR BLD: 54.7 % (ref 38–73)
NRBC BLD-RTO: 0 /100 WBC
NRBC BLD-RTO: 0 /100 WBC
PLATELET # BLD AUTO: 237 K/UL (ref 150–450)
PLATELET # BLD AUTO: 261 K/UL (ref 150–450)
PMV BLD AUTO: 10.1 FL (ref 9.2–12.9)
PMV BLD AUTO: 9.9 FL (ref 9.2–12.9)
POTASSIUM SERPL-SCNC: 3.6 MMOL/L (ref 3.5–5.1)
PROT SERPL-MCNC: 7.2 G/DL (ref 6–8.4)
PROT UR-MCNC: 20 MG/DL (ref 0–15)
PROT/CREAT UR: 1.37 MG/G{CREAT} (ref 0–0.2)
RBC # BLD AUTO: 4.31 M/UL (ref 4–5.4)
RBC # BLD AUTO: 4.48 M/UL (ref 4–5.4)
RH BLD: NORMAL
SODIUM SERPL-SCNC: 138 MMOL/L (ref 136–145)
TREPONEMA PALLIDUM IGG+IGM AB [PRESENCE] IN SERUM OR PLASMA BY IMMUNOASSAY: NONREACTIVE
WBC # BLD AUTO: 4.48 K/UL (ref 3.9–12.7)
WBC # BLD AUTO: 4.75 K/UL (ref 3.9–12.7)

## 2024-12-13 PROCEDURE — 59409 OBSTETRICAL CARE: CPT | Mod: QY,ANES,, | Performed by: STUDENT IN AN ORGANIZED HEALTH CARE EDUCATION/TRAINING PROGRAM

## 2024-12-13 PROCEDURE — 87389 HIV-1 AG W/HIV-1&-2 AB AG IA: CPT | Performed by: OBSTETRICS & GYNECOLOGY

## 2024-12-13 PROCEDURE — 59409 OBSTETRICAL CARE: CPT | Mod: QX,CRNA,, | Performed by: NURSE ANESTHETIST, CERTIFIED REGISTERED

## 2024-12-13 PROCEDURE — 87340 HEPATITIS B SURFACE AG IA: CPT | Performed by: OBSTETRICS & GYNECOLOGY

## 2024-12-13 PROCEDURE — 72100002 HC LABOR CARE, 1ST 8 HOURS

## 2024-12-13 PROCEDURE — 59025 FETAL NON-STRESS TEST: CPT

## 2024-12-13 PROCEDURE — 25000003 PHARM REV CODE 250: Performed by: NURSE ANESTHETIST, CERTIFIED REGISTERED

## 2024-12-13 PROCEDURE — 25000003 PHARM REV CODE 250: Performed by: OBSTETRICS & GYNECOLOGY

## 2024-12-13 PROCEDURE — 99211 OFF/OP EST MAY X REQ PHY/QHP: CPT

## 2024-12-13 PROCEDURE — 62326 NJX INTERLAMINAR LMBR/SAC: CPT | Performed by: NURSE ANESTHETIST, CERTIFIED REGISTERED

## 2024-12-13 PROCEDURE — 86901 BLOOD TYPING SEROLOGIC RH(D): CPT | Performed by: OBSTETRICS & GYNECOLOGY

## 2024-12-13 PROCEDURE — 80053 COMPREHEN METABOLIC PANEL: CPT | Performed by: OBSTETRICS & GYNECOLOGY

## 2024-12-13 PROCEDURE — 11000001 HC ACUTE MED/SURG PRIVATE ROOM

## 2024-12-13 PROCEDURE — 85025 COMPLETE CBC W/AUTO DIFF WBC: CPT | Performed by: OBSTETRICS & GYNECOLOGY

## 2024-12-13 PROCEDURE — 27200710 HC EPIDURAL INFUSION PUMP SET: Performed by: STUDENT IN AN ORGANIZED HEALTH CARE EDUCATION/TRAINING PROGRAM

## 2024-12-13 PROCEDURE — 82570 ASSAY OF URINE CREATININE: CPT | Performed by: OBSTETRICS & GYNECOLOGY

## 2024-12-13 PROCEDURE — 86593 SYPHILIS TEST NON-TREP QUANT: CPT | Performed by: OBSTETRICS & GYNECOLOGY

## 2024-12-13 PROCEDURE — 36415 COLL VENOUS BLD VENIPUNCTURE: CPT | Performed by: OBSTETRICS & GYNECOLOGY

## 2024-12-13 PROCEDURE — C1751 CATH, INF, PER/CENT/MIDLINE: HCPCS | Performed by: STUDENT IN AN ORGANIZED HEALTH CARE EDUCATION/TRAINING PROGRAM

## 2024-12-13 PROCEDURE — 51702 INSERT TEMP BLADDER CATH: CPT

## 2024-12-13 PROCEDURE — 63600175 PHARM REV CODE 636 W HCPCS: Performed by: OBSTETRICS & GYNECOLOGY

## 2024-12-13 RX ORDER — CARBOPROST TROMETHAMINE 250 UG/ML
250 INJECTION, SOLUTION INTRAMUSCULAR
Status: DISCONTINUED | OUTPATIENT
Start: 2024-12-13 | End: 2024-12-15 | Stop reason: SDUPTHER

## 2024-12-13 RX ORDER — OXYTOCIN 10 [USP'U]/ML
10 INJECTION, SOLUTION INTRAMUSCULAR; INTRAVENOUS ONCE AS NEEDED
Status: DISCONTINUED | OUTPATIENT
Start: 2024-12-13 | End: 2024-12-17 | Stop reason: HOSPADM

## 2024-12-13 RX ORDER — OXYTOCIN-SODIUM CHLORIDE 0.9% IV SOLN 30 UNIT/500ML 30-0.9/5 UT/ML-%
10 SOLUTION INTRAVENOUS ONCE AS NEEDED
Status: DISCONTINUED | OUTPATIENT
Start: 2024-12-13 | End: 2024-12-17 | Stop reason: HOSPADM

## 2024-12-13 RX ORDER — OXYTOCIN-SODIUM CHLORIDE 0.9% IV SOLN 30 UNIT/500ML 30-0.9/5 UT/ML-%
95 SOLUTION INTRAVENOUS ONCE AS NEEDED
Status: DISCONTINUED | OUTPATIENT
Start: 2024-12-13 | End: 2024-12-14 | Stop reason: SDUPTHER

## 2024-12-13 RX ORDER — FENTANYL/BUPIVACAINE/NS/PF 2MCG/ML-.1
PLASTIC BAG, INJECTION (ML) INJECTION CONTINUOUS PRN
Status: DISCONTINUED | OUTPATIENT
Start: 2024-12-13 | End: 2024-12-15

## 2024-12-13 RX ORDER — MUPIROCIN 20 MG/G
OINTMENT TOPICAL
Status: DISCONTINUED | OUTPATIENT
Start: 2024-12-13 | End: 2024-12-17 | Stop reason: HOSPADM

## 2024-12-13 RX ORDER — SODIUM CHLORIDE, SODIUM LACTATE, POTASSIUM CHLORIDE, CALCIUM CHLORIDE 600; 310; 30; 20 MG/100ML; MG/100ML; MG/100ML; MG/100ML
INJECTION, SOLUTION INTRAVENOUS CONTINUOUS
Status: DISCONTINUED | OUTPATIENT
Start: 2024-12-13 | End: 2024-12-17 | Stop reason: HOSPADM

## 2024-12-13 RX ORDER — ONDANSETRON 8 MG/1
8 TABLET, ORALLY DISINTEGRATING ORAL EVERY 8 HOURS PRN
Status: DISCONTINUED | OUTPATIENT
Start: 2024-12-13 | End: 2024-12-14 | Stop reason: SDUPTHER

## 2024-12-13 RX ORDER — ACETAMINOPHEN 500 MG
500 TABLET ORAL EVERY 6 HOURS PRN
Status: DISCONTINUED | OUTPATIENT
Start: 2024-12-13 | End: 2024-12-17 | Stop reason: HOSPADM

## 2024-12-13 RX ORDER — MISOPROSTOL 100 MCG
50 TABLET ORAL EVERY 6 HOURS
Status: DISCONTINUED | OUTPATIENT
Start: 2024-12-13 | End: 2024-12-13

## 2024-12-13 RX ORDER — MISOPROSTOL 200 UG/1
800 TABLET ORAL ONCE AS NEEDED
Status: DISCONTINUED | OUTPATIENT
Start: 2024-12-13 | End: 2024-12-15 | Stop reason: SDUPTHER

## 2024-12-13 RX ORDER — MISOPROSTOL 100 MCG
50 TABLET ORAL EVERY 4 HOURS
Status: DISCONTINUED | OUTPATIENT
Start: 2024-12-13 | End: 2024-12-14

## 2024-12-13 RX ORDER — SODIUM CHLORIDE 9 MG/ML
INJECTION, SOLUTION INTRAVENOUS
Status: DISCONTINUED | OUTPATIENT
Start: 2024-12-13 | End: 2024-12-17 | Stop reason: HOSPADM

## 2024-12-13 RX ORDER — CALCIUM CARBONATE 200(500)MG
500 TABLET,CHEWABLE ORAL 3 TIMES DAILY PRN
Status: DISCONTINUED | OUTPATIENT
Start: 2024-12-13 | End: 2024-12-17 | Stop reason: HOSPADM

## 2024-12-13 RX ORDER — OXYTOCIN-SODIUM CHLORIDE 0.9% IV SOLN 30 UNIT/500ML 30-0.9/5 UT/ML-%
30 SOLUTION INTRAVENOUS ONCE AS NEEDED
Status: DISCONTINUED | OUTPATIENT
Start: 2024-12-13 | End: 2024-12-17 | Stop reason: HOSPADM

## 2024-12-13 RX ORDER — MISOPROSTOL 200 UG/1
800 TABLET ORAL ONCE AS NEEDED
Status: DISCONTINUED | OUTPATIENT
Start: 2024-12-13 | End: 2024-12-17 | Stop reason: HOSPADM

## 2024-12-13 RX ORDER — DIPHENOXYLATE HYDROCHLORIDE AND ATROPINE SULFATE 2.5; .025 MG/1; MG/1
2 TABLET ORAL EVERY 6 HOURS PRN
Status: DISCONTINUED | OUTPATIENT
Start: 2024-12-13 | End: 2024-12-15 | Stop reason: SDUPTHER

## 2024-12-13 RX ORDER — ONDANSETRON 8 MG/1
8 TABLET, ORALLY DISINTEGRATING ORAL EVERY 8 HOURS PRN
Status: DISCONTINUED | OUTPATIENT
Start: 2024-12-13 | End: 2024-12-17 | Stop reason: HOSPADM

## 2024-12-13 RX ORDER — SIMETHICONE 80 MG
1 TABLET,CHEWABLE ORAL 4 TIMES DAILY PRN
Status: DISCONTINUED | OUTPATIENT
Start: 2024-12-13 | End: 2024-12-17 | Stop reason: HOSPADM

## 2024-12-13 RX ORDER — OXYTOCIN-SODIUM CHLORIDE 0.9% IV SOLN 30 UNIT/500ML 30-0.9/5 UT/ML-%
95 SOLUTION INTRAVENOUS CONTINUOUS PRN
Status: DISCONTINUED | OUTPATIENT
Start: 2024-12-13 | End: 2024-12-17 | Stop reason: HOSPADM

## 2024-12-13 RX ORDER — LIDOCAINE HYDROCHLORIDE 10 MG/ML
10 INJECTION, SOLUTION INFILTRATION; PERINEURAL ONCE AS NEEDED
Status: DISCONTINUED | OUTPATIENT
Start: 2024-12-13 | End: 2024-12-17 | Stop reason: HOSPADM

## 2024-12-13 RX ORDER — METHYLERGONOVINE MALEATE 0.2 MG/ML
200 INJECTION INTRAVENOUS ONCE AS NEEDED
Status: DISCONTINUED | OUTPATIENT
Start: 2024-12-13 | End: 2024-12-15 | Stop reason: SDUPTHER

## 2024-12-13 RX ADMIN — MISOPROSTOL 50 MCG: 100 TABLET ORAL at 04:12

## 2024-12-13 RX ADMIN — SODIUM CHLORIDE, POTASSIUM CHLORIDE, SODIUM LACTATE AND CALCIUM CHLORIDE: 600; 310; 30; 20 INJECTION, SOLUTION INTRAVENOUS at 07:12

## 2024-12-13 RX ADMIN — Medication 10 ML/HR: at 08:12

## 2024-12-13 RX ADMIN — MISOPROSTOL 50 MCG: 100 TABLET ORAL at 09:12

## 2024-12-13 NOTE — TELEPHONE ENCOUNTER
----- Message from Maria Esther sent at 12/12/2024  4:50 PM CST -----  Type : Patient Call        Who Called : Patient      Does the patient know what this is regarding?: Patient is requesting a callback with ; pt called in on today; I spoke with patient with [facility](In house ) regarding providers request for her to go to L&D; pt says she doesn't know what the apt is for; Pt is wanting to know if she could come to the clinic on tomorrow 12/13; advised pt that it is for her Pre E; please contact pt to advise her if she could go into clinic on tomorrow 12/13; please advise          Would the patient rather a call back or a response via [facility]? Call        Best Call Back Number: [phone]          Additional Information:

## 2024-12-13 NOTE — TELEPHONE ENCOUNTER
Spoke with pt with  # [phone] to translate! Pt was notified that she dont need to come to the clinic that she needs to go to L&D to be evaluated. Pt stated that she will go to L&D now.

## 2024-12-13 NOTE — H&P
HISTORY AND PHYSICAL                                                OBSTETRICS        Chief Complaint: Pre-eclampsia without SF    Subjective:       Letty Hillman is a 24 y.o.  female with IUP at 38w2d weeks gestation who was admitted by Dr. Thorne for pre-eclampsia without SF. Patient had mild range BP but pc ratio increased from 0.77 to 1.37. She denies headaches, blurry vision or RUQ pain. This pregnancy has been c/b LPNC.     PMHx: No past medical history on file.    PSHx: No past surgical history on file.    All: Review of patient's allergies indicates:  No Known Allergies    Meds:   Medications Prior to Admission   Medication Sig Dispense Refill Last Dose/Taking    PNV 11-iron fum-folic acid-om3 28 mg iron-1 mg -200 mg Cap Take 1 tablet by mouth once daily. 90 each 4        SH:   Social History     Socioeconomic History    Marital status: Single   Tobacco Use    Smoking status: Never    Smokeless tobacco: Never   Substance and Sexual Activity    Alcohol use: Not Currently    Drug use: Not Currently    Sexual activity: Yes     Partners: Male       FH: No family history on file.    OBHx:   OB History    Para Term  AB Living   1 0 0 0 0 0   SAB IAB Ectopic Multiple Live Births   0 0 0 0 0      # Outcome Date GA Lbr Roney/2nd Weight Sex Type Anes PTL Lv   1 Current                Objective:       /73   Pulse 82   Temp 98.5 °F (36.9 °C)   SpO2 100%     Vitals:    24 1641 24 1642 24 1646 24 1648   BP:    123/73   Pulse: 88 94 94 82   Temp:       SpO2: 100%  100%        General:   alert, appears stated age, and cooperative   Lungs:    Non-labored   Heart:   regular rate and rhythm   Abdomen:  soft, non-tender; bowel sounds normal; no masses,  no organomegaly   Extremities negative edema, negative erythema   FHT: 135 bpm Cat 1 (reassuring)                 TOCO: Q 2 minutes   Presentations: cephalic by ultrasound   Cervix:     Dilation: Closed     Effacement: 60%    Station:  -3    Consistency: soft    Position: posterior     Lab Review  Blood Type O  GBBS: negative  Rubella: Not immune  RPR: NR  HIV: negative  HepB: negative       Assessment:       38w2d weeks gestation, IOL secondary to preeclampsia with SF    Active Hospital Problems    Diagnosis  POA    *Pre-eclampsia in third trimester [O14.93]  Yes      Resolved Hospital Problems   No resolved problems to display.          Plan:      Risks, benefits, alternatives and possible complications have been discussed in detail with the patient.   - Consents signed and to chart  - Admit to Labor and Delivery unit  - Epidural per Anesthesia  - Draw CBC, T&S  - Notify Staff  - Recheck in 4 hrs or PRN    Post-Partum Hemorrhage risk - low          Malathi Ladd MD, MAS, FACOG  Obstetrics and Gynecology

## 2024-12-14 PROCEDURE — 25000003 PHARM REV CODE 250: Performed by: OBSTETRICS & GYNECOLOGY

## 2024-12-14 PROCEDURE — 72100003 HC LABOR CARE, EA. ADDL. 8 HRS

## 2024-12-14 PROCEDURE — 63600175 PHARM REV CODE 636 W HCPCS: Performed by: OBSTETRICS & GYNECOLOGY

## 2024-12-14 PROCEDURE — 63600175 PHARM REV CODE 636 W HCPCS: Performed by: STUDENT IN AN ORGANIZED HEALTH CARE EDUCATION/TRAINING PROGRAM

## 2024-12-14 PROCEDURE — 11000001 HC ACUTE MED/SURG PRIVATE ROOM

## 2024-12-14 RX ORDER — MISOPROSTOL 100 MCG
25 TABLET ORAL ONCE
Status: COMPLETED | OUTPATIENT
Start: 2024-12-15 | End: 2024-12-15

## 2024-12-14 RX ORDER — NALBUPHINE HYDROCHLORIDE 10 MG/ML
2.5 INJECTION INTRAMUSCULAR; INTRAVENOUS; SUBCUTANEOUS ONCE
Status: COMPLETED | OUTPATIENT
Start: 2024-12-14 | End: 2024-12-14

## 2024-12-14 RX ADMIN — NALBUPHINE HYDROCHLORIDE 2.5 MG: 10 INJECTION, SOLUTION INTRAMUSCULAR; INTRAVENOUS; SUBCUTANEOUS at 06:12

## 2024-12-14 RX ADMIN — DINOPROSTONE 10 MG: 10 INSERT VAGINAL at 09:12

## 2024-12-14 RX ADMIN — ACETAMINOPHEN 500 MG: 500 TABLET ORAL at 07:12

## 2024-12-14 RX ADMIN — SODIUM CHLORIDE, POTASSIUM CHLORIDE, SODIUM LACTATE AND CALCIUM CHLORIDE: 600; 310; 30; 20 INJECTION, SOLUTION INTRAVENOUS at 09:12

## 2024-12-14 RX ADMIN — MISOPROSTOL 50 MCG: 100 TABLET ORAL at 02:12

## 2024-12-14 NOTE — ANESTHESIA PROCEDURE NOTES
Epidural    Patient location during procedure: OB   Reason for block: primary anesthetic   Reason for block: labor analgesia requested by patient and obstetrician  Diagnosis: iup   Start time: 12/13/2024 8:00 PM  Timeout: 12/13/2024 8:00 PM  End time: 12/13/2024 8:30 PM    Staffing  Performing Provider: Kellen Herndon CRNA  Authorizing Provider: James Anguiano MD    Staffing  Performed by: Kellen Herndon CRNA  Authorized by: James Anguiano MD        Preanesthetic Checklist  Completed: patient identified, IV checked, site marked, risks and benefits discussed, surgical consent, monitors and equipment checked, pre-op evaluation, timeout performed, anesthesia consent given, hand hygiene performed and patient being monitored  Preparation  Patient position: sitting  Prep: ChloraPrep  Patient monitoring: ECG, Pulse Ox and Blood Pressure  Reason for block: primary anesthetic   Epidural  Skin Anesthetic: lidocaine 1%  Skin Wheal: 3 mL  Administration type: continuous  Approach: midline  Interspace: L4-5    Injection technique: ADELITA saline  Needle and Epidural Catheter  Needle type: Tuohy   Needle gauge: 17  Needle length: 3.5 inches  Needle insertion depth: 6 cm  Catheter type: [employer]  Catheter size: 19 G  Catheter at skin depth: 11 cm  Insertion Attempts: 1  Test dose: 3 mL of lidocaine 1.5% with Epi 1-to-200,000  Additional Documentation: incremental injection, no paresthesia on injection, no significant pain on injection, negative aspiration for heme and CSF, no signs/symptoms of IV or SA injection and no significant complaints from patient  Needle localization: anatomical landmarks  Additional Notes  Translation via Tony: Mj #[id number]

## 2024-12-14 NOTE — ANESTHESIA PREPROCEDURE EVALUATION
"[facility]  Anesthesia Pre-Operative Evaluation         Patient Name: Letty Hillman  YOB: 2000  MRN: [id number]    2024      Letty Hillman is a 24 y.o. female  @ 38w2d who presents for IOL 2/2 to preE without SF.  Patient denies any cardiopulmonary dz, scoliosis or bleeding dz.     OB History    Para Term  AB Living   1             SAB IAB Ectopic Multiple Live Births                  # Outcome Date GA Lbr Roney/2nd Weight Sex Type Anes PTL Lv   1 Current                Review of patient's allergies indicates:  No Known Allergies    Wt Readings from Last 1 Encounters:   12/10/24 1120 62.7 kg (138 lb 3.7 oz)       BP Readings from Last 3 Encounters:   24 121/80   12/10/24 135/82   24 128/86       Patient Active Problem List   Diagnosis    Pre-eclampsia in third trimester       No past surgical history on file.    Social History     Socioeconomic History    Marital status: Single   Tobacco Use    Smoking status: Never    Smokeless tobacco: Never   Substance and Sexual Activity    Alcohol use: Not Currently    Drug use: Not Currently    Sexual activity: Yes     Partners: Male         Chemistry        Component Value Date/Time     2024 1148    K 3.6 2024 1148     2024 1148    CO2 19 (L) 2024 1148    BUN 8 2024 1148    CREATININE 0.7 2024 1148     2024 1148        Component Value Date/Time    CALCIUM 9.0 2024 1148    ALKPHOS 428 (H) 2024 1148    AST 50 (H) 2024 1148    ALT 57 (H) 2024 1148    BILITOT 0.7 2024 1148            Lab Results   Component Value Date    WBC 4.75 2024    HGB 11.9 (L) 2024    HCT 36.6 (L) 2024    MCV 82 2024     2024       No results for input(s): "PT", "INR", "PROTIME", "APTT" in the last 72 hours.            Pre-op Assessment    I have reviewed the Patient Summary Reports.   "   I have reviewed the Nursing Notes. I have reviewed the NPO Status.   I have reviewed the Medications.     Review of Systems  Anesthesia Hx:  No previous Anesthesia             Denies Family Hx of Anesthesia complications.    Denies Personal Hx of Anesthesia complications.                    Hematology/Oncology:  Hematology Normal   Oncology Normal                                   EENT/Dental:  EENT/Dental Normal           Cardiovascular:     Hypertension                                          Pulmonary:  Pulmonary Normal                       Renal/:  Renal/ Normal                 Hepatic/GI:  Hepatic/GI Normal                    Neurological:  Neurology Normal                                      Endocrine:  Endocrine Normal            Psych:  Psychiatric Normal                    Physical Exam  General: Cooperative, Alert and Oriented    Airway:  Mallampati: III   Mouth Opening: Normal  TM Distance: Normal  Tongue: Normal  Neck ROM: Normal ROM    Dental:  Intact        Anesthesia Plan  Type of Anesthesia, risks & benefits discussed:    Anesthesia Type: Epidural, Spinal, CSE  Intra-op Monitoring Plan: Standard ASA Monitors  Post Op Pain Control Plan: multimodal analgesia and epidural analgesia  Informed Consent: Informed consent signed with the Patient and all parties understand the risks and agree with anesthesia plan.  All questions answered.   ASA Score: 2  Day of Surgery Review of History & Physical: H&P Update referred to the surgeon/provider.    Ready For Surgery From Anesthesia Perspective.     .

## 2024-12-15 PROCEDURE — 72100003 HC LABOR CARE, EA. ADDL. 8 HRS

## 2024-12-15 PROCEDURE — 59409 OBSTETRICAL CARE: CPT | Mod: AT,,, | Performed by: OBSTETRICS & GYNECOLOGY

## 2024-12-15 PROCEDURE — 3E0DXGC INTRODUCTION OF OTHER THERAPEUTIC SUBSTANCE INTO MOUTH AND PHARYNX, EXTERNAL APPROACH: ICD-10-PCS | Performed by: OBSTETRICS & GYNECOLOGY

## 2024-12-15 PROCEDURE — 99900035 HC TECH TIME PER 15 MIN (STAT)

## 2024-12-15 PROCEDURE — 11000001 HC ACUTE MED/SURG PRIVATE ROOM

## 2024-12-15 PROCEDURE — 25000003 PHARM REV CODE 250: Performed by: OBSTETRICS & GYNECOLOGY

## 2024-12-15 PROCEDURE — 10907ZC DRAINAGE OF AMNIOTIC FLUID, THERAPEUTIC FROM PRODUCTS OF CONCEPTION, VIA NATURAL OR ARTIFICIAL OPENING: ICD-10-PCS | Performed by: OBSTETRICS & GYNECOLOGY

## 2024-12-15 PROCEDURE — 3E0P7VZ INTRODUCTION OF HORMONE INTO FEMALE REPRODUCTIVE, VIA NATURAL OR ARTIFICIAL OPENING: ICD-10-PCS | Performed by: OBSTETRICS & GYNECOLOGY

## 2024-12-15 PROCEDURE — 27000181 HC CABLE, IUPC

## 2024-12-15 PROCEDURE — 72200005 HC VAGINAL DELIVERY LEVEL II

## 2024-12-15 PROCEDURE — 0KQM0ZZ REPAIR PERINEUM MUSCLE, OPEN APPROACH: ICD-10-PCS | Performed by: OBSTETRICS & GYNECOLOGY

## 2024-12-15 PROCEDURE — 63600175 PHARM REV CODE 636 W HCPCS: Performed by: OBSTETRICS & GYNECOLOGY

## 2024-12-15 RX ORDER — DOCUSATE SODIUM 100 MG/1
200 CAPSULE, LIQUID FILLED ORAL 2 TIMES DAILY PRN
Status: DISCONTINUED | OUTPATIENT
Start: 2024-12-15 | End: 2024-12-17 | Stop reason: HOSPADM

## 2024-12-15 RX ORDER — SODIUM CHLORIDE 0.9 % (FLUSH) 0.9 %
10 SYRINGE (ML) INJECTION
Status: DISCONTINUED | OUTPATIENT
Start: 2024-12-15 | End: 2024-12-17 | Stop reason: HOSPADM

## 2024-12-15 RX ORDER — PRENATAL WITH FERROUS FUM AND FOLIC ACID 3080; 920; 120; 400; 22; 1.84; 3; 20; 10; 1; 12; 200; 27; 25; 2 [IU]/1; [IU]/1; MG/1; [IU]/1; MG/1; MG/1; MG/1; MG/1; MG/1; MG/1; UG/1; MG/1; MG/1; MG/1; MG/1
1 TABLET ORAL DAILY
Status: DISCONTINUED | OUTPATIENT
Start: 2024-12-15 | End: 2024-12-17 | Stop reason: HOSPADM

## 2024-12-15 RX ORDER — MISOPROSTOL 200 UG/1
800 TABLET ORAL ONCE AS NEEDED
Status: DISCONTINUED | OUTPATIENT
Start: 2024-12-15 | End: 2024-12-17 | Stop reason: HOSPADM

## 2024-12-15 RX ORDER — DIPHENHYDRAMINE HYDROCHLORIDE 50 MG/ML
25 INJECTION INTRAMUSCULAR; INTRAVENOUS EVERY 4 HOURS PRN
Status: DISCONTINUED | OUTPATIENT
Start: 2024-12-15 | End: 2024-12-17 | Stop reason: HOSPADM

## 2024-12-15 RX ORDER — OXYTOCIN-SODIUM CHLORIDE 0.9% IV SOLN 30 UNIT/500ML 30-0.9/5 UT/ML-%
10 SOLUTION INTRAVENOUS ONCE AS NEEDED
Status: DISCONTINUED | OUTPATIENT
Start: 2024-12-15 | End: 2024-12-17 | Stop reason: HOSPADM

## 2024-12-15 RX ORDER — ACETAMINOPHEN 325 MG/1
650 TABLET ORAL EVERY 6 HOURS SCHEDULED
Status: DISCONTINUED | OUTPATIENT
Start: 2024-12-15 | End: 2024-12-17 | Stop reason: HOSPADM

## 2024-12-15 RX ORDER — OXYTOCIN-SODIUM CHLORIDE 0.9% IV SOLN 30 UNIT/500ML 30-0.9/5 UT/ML-%
0-32 SOLUTION INTRAVENOUS CONTINUOUS
Status: DISCONTINUED | OUTPATIENT
Start: 2024-12-15 | End: 2024-12-17 | Stop reason: HOSPADM

## 2024-12-15 RX ORDER — FENTANYL/BUPIVACAINE/NS/PF 2MCG/ML-.1
PLASTIC BAG, INJECTION (ML) INJECTION CONTINUOUS
Status: DISCONTINUED | OUTPATIENT
Start: 2024-12-15 | End: 2024-12-17 | Stop reason: HOSPADM

## 2024-12-15 RX ORDER — OXYTOCIN-SODIUM CHLORIDE 0.9% IV SOLN 30 UNIT/500ML 30-0.9/5 UT/ML-%
95 SOLUTION INTRAVENOUS ONCE AS NEEDED
Status: DISCONTINUED | OUTPATIENT
Start: 2024-12-15 | End: 2024-12-17 | Stop reason: HOSPADM

## 2024-12-15 RX ORDER — OXYTOCIN-SODIUM CHLORIDE 0.9% IV SOLN 30 UNIT/500ML 30-0.9/5 UT/ML-%
95 SOLUTION INTRAVENOUS CONTINUOUS PRN
Status: DISCONTINUED | OUTPATIENT
Start: 2024-12-15 | End: 2024-12-17 | Stop reason: HOSPADM

## 2024-12-15 RX ORDER — HYDROCORTISONE 25 MG/G
CREAM TOPICAL 3 TIMES DAILY PRN
Status: DISCONTINUED | OUTPATIENT
Start: 2024-12-15 | End: 2024-12-17 | Stop reason: HOSPADM

## 2024-12-15 RX ORDER — HYDROCODONE BITARTRATE AND ACETAMINOPHEN 5; 325 MG/1; MG/1
1 TABLET ORAL EVERY 4 HOURS PRN
Status: DISCONTINUED | OUTPATIENT
Start: 2024-12-15 | End: 2024-12-17 | Stop reason: HOSPADM

## 2024-12-15 RX ORDER — IBUPROFEN 600 MG/1
600 TABLET ORAL EVERY 6 HOURS
Status: DISCONTINUED | OUTPATIENT
Start: 2024-12-15 | End: 2024-12-17 | Stop reason: HOSPADM

## 2024-12-15 RX ORDER — SIMETHICONE 80 MG
1 TABLET,CHEWABLE ORAL EVERY 6 HOURS PRN
Status: DISCONTINUED | OUTPATIENT
Start: 2024-12-15 | End: 2024-12-17 | Stop reason: HOSPADM

## 2024-12-15 RX ORDER — CARBOPROST TROMETHAMINE 250 UG/ML
250 INJECTION, SOLUTION INTRAMUSCULAR
Status: DISCONTINUED | OUTPATIENT
Start: 2024-12-15 | End: 2024-12-17 | Stop reason: HOSPADM

## 2024-12-15 RX ORDER — ONDANSETRON 8 MG/1
8 TABLET, ORALLY DISINTEGRATING ORAL EVERY 8 HOURS PRN
Status: DISCONTINUED | OUTPATIENT
Start: 2024-12-15 | End: 2024-12-17 | Stop reason: HOSPADM

## 2024-12-15 RX ORDER — OXYTOCIN 10 [USP'U]/ML
10 INJECTION, SOLUTION INTRAMUSCULAR; INTRAVENOUS ONCE AS NEEDED
Status: DISCONTINUED | OUTPATIENT
Start: 2024-12-15 | End: 2024-12-17 | Stop reason: HOSPADM

## 2024-12-15 RX ORDER — METHYLERGONOVINE MALEATE 0.2 MG/ML
200 INJECTION INTRAVENOUS ONCE AS NEEDED
Status: DISCONTINUED | OUTPATIENT
Start: 2024-12-15 | End: 2024-12-17 | Stop reason: HOSPADM

## 2024-12-15 RX ORDER — HYDROCODONE BITARTRATE AND ACETAMINOPHEN 7.5; 325 MG/1; MG/1
1 TABLET ORAL EVERY 4 HOURS PRN
Status: DISCONTINUED | OUTPATIENT
Start: 2024-12-15 | End: 2024-12-17 | Stop reason: HOSPADM

## 2024-12-15 RX ORDER — SODIUM CITRATE AND CITRIC ACID MONOHYDRATE 334; 500 MG/5ML; MG/5ML
30 SOLUTION ORAL ONCE
Status: DISCONTINUED | OUTPATIENT
Start: 2024-12-15 | End: 2024-12-17 | Stop reason: HOSPADM

## 2024-12-15 RX ORDER — DIPHENHYDRAMINE HCL 25 MG
25 CAPSULE ORAL EVERY 4 HOURS PRN
Status: DISCONTINUED | OUTPATIENT
Start: 2024-12-15 | End: 2024-12-17 | Stop reason: HOSPADM

## 2024-12-15 RX ORDER — DIPHENOXYLATE HYDROCHLORIDE AND ATROPINE SULFATE 2.5; .025 MG/1; MG/1
2 TABLET ORAL EVERY 6 HOURS PRN
Status: DISCONTINUED | OUTPATIENT
Start: 2024-12-15 | End: 2024-12-17 | Stop reason: HOSPADM

## 2024-12-15 RX ADMIN — ACETAMINOPHEN 500 MG: 500 TABLET ORAL at 10:12

## 2024-12-15 RX ADMIN — ACETAMINOPHEN 650 MG: 325 TABLET ORAL at 11:12

## 2024-12-15 RX ADMIN — HYDROCODONE BITARTRATE AND ACETAMINOPHEN 1 TABLET: 5; 325 TABLET ORAL at 08:12

## 2024-12-15 RX ADMIN — IBUPROFEN 600 MG: 600 TABLET, FILM COATED ORAL at 05:12

## 2024-12-15 RX ADMIN — ACETAMINOPHEN 650 MG: 325 TABLET ORAL at 05:12

## 2024-12-15 RX ADMIN — Medication 2 MILLI-UNITS/MIN: at 04:12

## 2024-12-15 RX ADMIN — IBUPROFEN 600 MG: 600 TABLET, FILM COATED ORAL at 11:12

## 2024-12-15 RX ADMIN — MISOPROSTOL 25 MCG: 100 TABLET ORAL at 12:12

## 2024-12-15 NOTE — PROGRESS NOTES
[facility] - Labor & Delivery  Obstetrics  Labor Progress Note    Patient Name: Letty Hillman  MRN: [id number]  Admission Date: 2024  Hospital Length of Stay: 2 days  Attending Physician: Jhoan Thorne MD  Primary Care Provider: Nikki, Primary Doctor    Subjective:     Principal Problem:Pre-eclampsia in third trimester    Interval History:  Letty is a 24 y.o.  at 38w4d. She is doing well. Patient comfortable with epidural. No headache or visual disturbances noted    Objective:     Vital Signs (Most Recent):  Temp: 98.8 °F (37.1 °C) (12/15/24 0526)  Pulse: 76 (12/15/24 0531)  Resp: 16 (24 07)  BP: (!) 141/82 (12/15/24 0526)  SpO2: 100 % (12/15/24 05) Vital Signs (24h Range):  Temp:  [98.6 °F (37 °C)-98.9 °F (37.2 °C)] 98.8 °F (37.1 °C)  Pulse:  [] 76  SpO2:  [98 %-100 %] 100 %  BP: (116-178)/(58-87) 141/82     Weight: 62.7 kg (138 lb 3.7 oz)  Body mass index is 28.89 kg/m².    FHT: 130sCat 1 (reassuring)  TOCO:  Q 1-3 minutes    Physical Exam    Cervical Exam:  Dilation:  2  Effacement:  75%  Station: -2  Presentation: Vertex     Significant Labs:  Lab Results   Component Value Date    GROUPTRH O POS 2024    HEPBSAG Non-reactive 2024       CBC:   Recent Labs   Lab 24  1405   WBC 4.75   RBC 4.48   HGB 11.9*   HCT 36.6*      MCV 82   MCH 26.6*   MCHC 32.5     I have personallly reviewed all pertinent lab results from the last 24 hours.    Assessment/Plan:     24 y.o. female  at 38w4d for:    Active Diagnoses:    Diagnosis Date Noted POA    PRINCIPAL PROBLEM:  Pre-eclampsia in third trimester [O14.93] 2024 Yes      Problems Resolved During this Admission:       AROM with clear fluid noted. IUPC placed. Continue oxytocin augmentation. No signs or symptoms of severe pre-E.  Continue patient management.    Will Caballero MD  Obstetrics  [place] - Labor & Delivery

## 2024-12-15 NOTE — L&D DELIVERY NOTE
[facility] - Labor & Delivery  Vaginal Delivery   Operative Note    SUMMARY     Normal spontaneous vaginal delivery of live infant, was placed on mothers abdomen for skin to skin and bulb suctioning performed.  Infant delivered position OA over intact perineum.  Nuchal cord: No.    Spontaneous delivery of placenta and IV pitocin given noting good uterine tone.  2nd degree laceration noted and repaired in normal fashion with 3-0 and 2-0 vicryl .  Patient tolerated delivery well. Sponge needle and lap counted correctly x2.    Indications: Pre-eclampsia in third trimester  Pregnancy complicated by:   Patient Active Problem List   Diagnosis    Pre-eclampsia in third trimester     Admitting GA: 38w4d    Delivery Information for Levon Hillman    Birth information:  YOB: 2022   Time of birth: 12:03 PM   Sex: male   Head Delivery Date/Time: 12/15/2024 12:03 PM   Delivery type: Vaginal, Spontaneous   Gestational Age: 38w4d       Delivery Providers    Delivering clinician: Alexx Caballero MD   Provider Role    Jyoti Howard RN Ross, Lydia, RN Maureau, Linda F, RN Flores, Heather, RN Day, Jacquel,                Measurements    Weight: 2620 g  Weight (lbs): 5 lb 12.4 oz  Length:          Apgars    Living status: Living  Apgar Component Scores:  1 min.:  5 min.:  10 min.:  15 min.:  20 min.:    Skin color:  0  1       Heart rate:  2  2       Reflex irritability:  2  2       Muscle tone:  2  2       Respiratory effort:  2  2       Total:  8  9       Apgars assigned by: ARIC KWOK RN         Operative Delivery    Forceps attempted?: No  Vacuum extractor attempted?: No         Shoulder Dystocia    Shoulder dystocia present?: No           Presentation    Presentation: Vertex  Position: Left Occiput Anterior           Interventions/Resuscitation    Method: Bulb Suctioning, Tactile Stimulation       Cord    Vessels: 3 vessels  Complications: None  Delayed Cord Clamping?: Yes  Cord  Clamped Date/Time: 12/15/2024 12:04 PM  Cord Blood Disposition: Sent with Baby  Gases Sent?: No  Stem Cell Collection (by MD): No       Placenta    Placenta delivery date/time: 12/15/2024 1206  Placenta removal: Spontaneous  Placenta appearance: Intact  Placenta disposition: Discarded           Labor Events:       labor: No     Labor Onset Date/Time:         Dilation Complete Date/Time:         Start Pushing Date/Time:         Start Pushing Date/Time:       Rupture Date/Time: 12/15/24 0745        Rupture type: ARM (Artificial Rupture)        Fluid Amount:       Fluid Color: Clear              steroids: None     Antibiotics given for GBS: No     Induction: misoprostol;dinoprostone insert     Indications for induction:        Augmentation: oxytocin     Indications for augmentation:       Labor complications: None     Additional complications:          Cervical ripening:                     Delivery:      Episiotomy: None     Indication for Episiotomy:       Perineal Lacerations: 2nd Repaired:  Yes   Periurethral Laceration:   Repaired:     Labial Laceration:   Repaired:     Sulcus Laceration:   Repaired:     Vaginal Laceration:   Repaired:     Cervical Laceration:   Repaired:     Repair suture: None     Repair # of packets:       Last Value - EBL - Nursing (mL):       Sum - EBL - Nursing (mL): 0     Last Value - EBL - Anesthesia (mL):      Calculated QBL (mL):       Running total QBL (mL):       Vaginal Sweep Performed: Yes     Surgicount Correct: Yes     Vaginal Packing: No Quantity:       Other providers:       Anesthesia    Method: Epidural          Details (if applicable):  Trial of Labor      Categorization:      Priority:     Indications for :     Incision Type:       Additional  information:  Forceps:    Vacuum:    Breech:    Observed anomalies    Other (Comments):

## 2024-12-15 NOTE — PROGRESS NOTES
[facility]  Labor & Delivery  Obstetrics  Labor Progress Note    Patient Name: Letty Hillman  MRN: [id number]  Admission Date: 2024  Hospital Length of Stay: 2 days  Attending Physician: Jhoan Thorne MD  Primary Care Provider: Nikki, Primary Doctor    Subjective:     Principal Problem:Pre-eclampsia in third trimester    Interval History:  Letty is a 24 y.o.  at 38w4d. She is doing well. Patient comfortable withg epidural    Objective:     Vital Signs (Most Recent):  Temp: 98.8 °F (37.1 °C) (12/15/24 05)  Pulse: 76 (12/15/24 0531)  Resp: 16 (24 07)  BP: (!) 141/82 (12/15/24 0526)  SpO2: 100 % (12/15/24 05) Vital Signs (24h Range):  Temp:  [98.6 °F (37 °C)-98.9 °F (37.2 °C)] 98.8 °F (37.1 °C)  Pulse:  [] 76  SpO2:  [98 %-100 %] 100 %  BP: (116-178)/(58-87) 141/82     Weight: 62.7 kg (138 lb 3.7 oz)  Body mass index is 28.89 kg/m².    FHT: 130sCat 1 (reassuring)  TOCO:  Q 4 minutes    Physical Exam    Cervical Exam:  Dilation:  1  Effacement:  25%  Station: -3  Presentation: Vertex     Significant Labs:  Lab Results   Component Value Date    GROUPTRH O POS 2024    HEPBSAG Non-reactive 2024       CBC:   Recent Labs   Lab 24  1405   WBC 4.75   RBC 4.48   HGB 11.9*   HCT 36.6*      MCV 82   MCH 26.6*   MCHC 32.5     I have personallly reviewed all pertinent lab results from the last 24 hours.    Assessment/Plan:     24 y.o. female  at 38w4d for:    Active Diagnoses:    Diagnosis Date Noted POA    PRINCIPAL PROBLEM:  Pre-eclampsia in third trimester [O14.93] 2024 Yes      Problems Resolved During this Admission:       Will continue cervidil. No signs or symptoms of severe pre-eclampsia. Will continue to manage    Will Caballero MD  Obstetrics  [facility] - Labor & Delivery

## 2024-12-16 LAB
ALBUMIN SERPL BCP-MCNC: 2.1 G/DL (ref 3.5–5.2)
ALP SERPL-CCNC: 341 U/L (ref 40–150)
ALT SERPL W/O P-5'-P-CCNC: 47 U/L (ref 10–44)
ANION GAP SERPL CALC-SCNC: 10 MMOL/L (ref 8–16)
AST SERPL-CCNC: 50 U/L (ref 10–40)
BASOPHILS # BLD AUTO: 0.04 K/UL (ref 0–0.2)
BASOPHILS NFR BLD: 0.3 % (ref 0–1.9)
BILIRUB SERPL-MCNC: 1.5 MG/DL (ref 0.1–1)
BUN SERPL-MCNC: 10 MG/DL (ref 6–20)
CALCIUM SERPL-MCNC: 8.8 MG/DL (ref 8.7–10.5)
CHLORIDE SERPL-SCNC: 109 MMOL/L (ref 95–110)
CO2 SERPL-SCNC: 19 MMOL/L (ref 23–29)
CREAT SERPL-MCNC: 0.7 MG/DL (ref 0.5–1.4)
DIFFERENTIAL METHOD BLD: ABNORMAL
EOSINOPHIL # BLD AUTO: 0.1 K/UL (ref 0–0.5)
EOSINOPHIL NFR BLD: 0.6 % (ref 0–8)
ERYTHROCYTE [DISTWIDTH] IN BLOOD BY AUTOMATED COUNT: 15.9 % (ref 11.5–14.5)
EST. GFR  (NO RACE VARIABLE): >60 ML/MIN/1.73 M^2
GLUCOSE SERPL-MCNC: 94 MG/DL (ref 70–110)
HCT VFR BLD AUTO: 31.9 % (ref 37–48.5)
HGB BLD-MCNC: 10.2 G/DL (ref 12–16)
IMM GRANULOCYTES # BLD AUTO: 0.05 K/UL (ref 0–0.04)
IMM GRANULOCYTES NFR BLD AUTO: 0.4 % (ref 0–0.5)
LYMPHOCYTES # BLD AUTO: 2 K/UL (ref 1–4.8)
LYMPHOCYTES NFR BLD: 15.4 % (ref 18–48)
MCH RBC QN AUTO: 26.2 PG (ref 27–31)
MCHC RBC AUTO-ENTMCNC: 32 G/DL (ref 32–36)
MCV RBC AUTO: 82 FL (ref 82–98)
MONOCYTES # BLD AUTO: 0.6 K/UL (ref 0.3–1)
MONOCYTES NFR BLD: 4.4 % (ref 4–15)
NEUTROPHILS # BLD AUTO: 10 K/UL (ref 1.8–7.7)
NEUTROPHILS NFR BLD: 78.9 % (ref 38–73)
NRBC BLD-RTO: 0 /100 WBC
PLATELET # BLD AUTO: 171 K/UL (ref 150–450)
PMV BLD AUTO: 10.4 FL (ref 9.2–12.9)
POTASSIUM SERPL-SCNC: 3.3 MMOL/L (ref 3.5–5.1)
PROT SERPL-MCNC: 6.2 G/DL (ref 6–8.4)
RBC # BLD AUTO: 3.9 M/UL (ref 4–5.4)
SODIUM SERPL-SCNC: 138 MMOL/L (ref 136–145)
WBC # BLD AUTO: 12.7 K/UL (ref 3.9–12.7)

## 2024-12-16 PROCEDURE — 87088 URINE BACTERIA CULTURE: CPT | Performed by: OBSTETRICS & GYNECOLOGY

## 2024-12-16 PROCEDURE — 80053 COMPREHEN METABOLIC PANEL: CPT | Performed by: OBSTETRICS & GYNECOLOGY

## 2024-12-16 PROCEDURE — 25000003 PHARM REV CODE 250: Performed by: OBSTETRICS & GYNECOLOGY

## 2024-12-16 PROCEDURE — 87086 URINE CULTURE/COLONY COUNT: CPT | Performed by: OBSTETRICS & GYNECOLOGY

## 2024-12-16 PROCEDURE — 36415 COLL VENOUS BLD VENIPUNCTURE: CPT | Performed by: OBSTETRICS & GYNECOLOGY

## 2024-12-16 PROCEDURE — 85025 COMPLETE CBC W/AUTO DIFF WBC: CPT | Performed by: OBSTETRICS & GYNECOLOGY

## 2024-12-16 PROCEDURE — 88307 TISSUE EXAM BY PATHOLOGIST: CPT | Performed by: PATHOLOGY

## 2024-12-16 PROCEDURE — 11000001 HC ACUTE MED/SURG PRIVATE ROOM

## 2024-12-16 RX ADMIN — ACETAMINOPHEN 650 MG: 325 TABLET ORAL at 05:12

## 2024-12-16 RX ADMIN — HYDROCODONE BITARTRATE AND ACETAMINOPHEN 1 TABLET: 5; 325 TABLET ORAL at 09:12

## 2024-12-16 RX ADMIN — IBUPROFEN 600 MG: 600 TABLET, FILM COATED ORAL at 05:12

## 2024-12-16 RX ADMIN — PRENATAL VIT W/ FE FUMARATE-FA TAB 27-0.8 MG 1 TABLET: 27-0.8 TAB at 09:12

## 2024-12-16 RX ADMIN — DIPHENHYDRAMINE HYDROCHLORIDE 25 MG: 25 CAPSULE ORAL at 08:12

## 2024-12-16 RX ADMIN — IBUPROFEN 600 MG: 600 TABLET, FILM COATED ORAL at 12:12

## 2024-12-16 NOTE — PLAN OF CARE
Vss, nad, pain well controlled w/po pain meds, tolerating po, ambulating, voiding w/o difficulty.  Poc: around the clock pain management, ambulation and po hydration encouraged, monitor I & O, continue w/poc.  Reviewed poc w/pt via  Gideon [id number].  Questions encouraged and answered.  Pt verbalized understanding.

## 2024-12-16 NOTE — ANESTHESIA POSTPROCEDURE EVALUATION
Anesthesia Post Evaluation    Patient: Letty Hillman    Procedure(s) Performed: * No procedures listed *    Final Anesthesia Type: epidural      Patient location during evaluation: labor & delivery  Patient participation: Yes- Able to Participate  Level of consciousness: awake and alert, oriented and awake  Post-procedure vital signs: reviewed and stable  Pain management: adequate  Airway patency: patent  RODGER mitigation strategies: Multimodal analgesia  PONV status at discharge: No PONV  Anesthetic complications: no      Cardiovascular status: blood pressure returned to baseline and hemodynamically stable  Respiratory status: unassisted and spontaneous ventilation  Hydration status: euvolemic  Follow-up not needed.              Vitals Value Taken Time   /76 12/16/24 0727   Temp 36.7 °C (98 °F) 12/16/24 0727   Pulse 88 12/16/24 0727   Resp 17 12/16/24 0917   SpO2 100 % 12/16/24 0727         No case tracking events are documented in the log.      Pain/Marciano Score: Pain Rating Prior to Med Admin: 4 (12/16/2024 12:25 PM)  Pain Rating Post Med Admin: 0 (12/16/2024  1:46 PM)

## 2024-12-16 NOTE — PLAN OF CARE
Note copied from Infant's chart (MRN: [id number])     SOCIAL WORK DISCHARGE PLANNING ASSESSMENT     SW completed discharge planning assessment with pt's mother via telephone with assistance from  Margaret [id number]. Pt's mother was easily engaged and education on the role of  was provided. Pt's mother reported all necessities for patient were obtained, including a car seat. Pt's mother reported she has minimal support from family and friends. Pt's father will provide transportation home following discharge. No needs for community resources were reported. Pt's mother was encouraged to call with any questions or concerns. Pt's mother verbalized understanding.      Legal Name: Rm Block    :  12/15/2024  Address: [address] [place] [address] [place] [address]  Parent's Phone Numbers: pt's mother Letty Block [facility] [phone]     Pediatrician:  Dr. Shauna Gipson         Problem List       Patient Active Problem List   Diagnosis    Vaginal delivery     infant of 39 completed weeks of gestation    SGA (small for gestational age)         Birth Hospital:[facility]           CIRO: 24     Birth Weight:   2.62 kg (5 lb 12.4 oz)              Birth Length: 47cm                  Gestational Age: 38w4d           Apgars    Living status: Living  Apgar Component Scores:  1 min.:  5 min.:  10 min.:  15 min.:  20 min.:    Skin color:  0  1          Heart rate:  2  2          Reflex irritability:  2  2          Muscle tone:  2  2          Respiratory effort:  2  2          Total:  8  9          Apgars assigned by: ARIC KWOK RN           24 0911   OB Discharge Planning Assessment   Assessment Type Discharge Planning Assessment   Source of Information family; utilized  (pt's mother with  Margaret [phone])   Verified Demographic and Insurance Information Yes   Insurance Medicaid   Medicaid Other (see comments)  ([employer])   Medicaid  Insurance Primary   Spiritual Affiliation Other    Contact Status none needed   Father's Involvement Fully Involved   Is Father signing the birth certificate Yes   Father's Address [address] [place] [address]   Family Involvement Minimal   Primary Contact Name and Number pt's mother Letty Hillman [phone] and pt's father Rafael Vyas [phone]   Received Prenatal Care Yes, Late   Transportation Anticipated family or friend will provide   Receive [facility] Benefits Already certified, will apply for new born    Arrangements Self;Family;Friends   Infant Feeding Plan formula feeding   Does baby have crib or safe sleep space? Yes   Do you have a car seat? Yes   Has other essential care items? Clothing;Bottles;Diapers   Pediatrician Dr. Shauna Gipson   Resources/Education Provided Preparing for Your Baby's Discharge Home   DCFS No indications (Indicators for Report)   Discharge Plan A Home with family

## 2024-12-16 NOTE — PROGRESS NOTES
PPD #1    S: patient doing well. Reports slight burning with urination.    O  Temp:  [98 °F (36.7 °C)-98.9 °F (37.2 °C)]   Pulse:  []   Resp:  [16-18]   BP: (109-178)/(58-95)   SpO2:  [88 %-100 %]    Gen: A&ox3, nAD  Abd; soft, nontender, uterine fundus below umbilicus  Ext: no LE edema noted    Lab Results   Component Value Date    WBC 12.70 12/16/2024    HGB 10.2 (L) 12/16/2024    HCT 31.9 (L) 12/16/2024    MCV 82 12/16/2024     12/16/2024     CMP  Sodium   Date Value Ref Range Status   12/13/2024 138 136 - 145 mmol/L Final     Potassium   Date Value Ref Range Status   12/13/2024 3.6 3.5 - 5.1 mmol/L Final     Chloride   Date Value Ref Range Status   12/13/2024 110 95 - 110 mmol/L Final     CO2   Date Value Ref Range Status   12/13/2024 19 (L) 23 - 29 mmol/L Final     Glucose   Date Value Ref Range Status   12/13/2024 110 70 - 110 mg/dL Final     BUN   Date Value Ref Range Status   12/13/2024 8 6 - 20 mg/dL Final     Creatinine   Date Value Ref Range Status   12/13/2024 0.7 0.5 - 1.4 mg/dL Final     Calcium   Date Value Ref Range Status   12/13/2024 9.0 8.7 - 10.5 mg/dL Final     Total Protein   Date Value Ref Range Status   12/13/2024 7.2 6.0 - 8.4 g/dL Final     Albumin   Date Value Ref Range Status   12/13/2024 2.5 (L) 3.5 - 5.2 g/dL Final     Total Bilirubin   Date Value Ref Range Status   12/13/2024 0.7 0.1 - 1.0 mg/dL Final     Comment:     For infants and newborns, interpretation of results should be based  on gestational age, weight and in agreement with clinical  observations.    Premature Infant recommended reference ranges:  Up to 24 hours.............<8.0 mg/dL  Up to 48 hours............<12.0 mg/dL  3-5 days..................<15.0 mg/dL  6-29 days.................<15.0 mg/dL       Alkaline Phosphatase   Date Value Ref Range Status   12/13/2024 428 (H) 40 - 150 U/L Final     AST   Date Value Ref Range Status   12/13/2024 50 (H) 10 - 40 U/L Final     ALT   Date Value Ref Range Status    2024 57 (H) 10 - 44 U/L Final     Anion Gap   Date Value Ref Range Status   2024 9 8 - 16 mmol/L Final     eGFR   Date Value Ref Range Status   2024 >60 >60 mL/min/1.73 m^2 Final         O    AP:  23 yo  female s/p , IOL secondary to pre E, doing well.  Continue routine pp care  PP H/H stable  BPs postpartum all normotensive  Repeat CMP ordered given elevated AST/ALT on admission  Dysuria: urine cx ordered    Anticipate possible d/c to home in the am  CARLO kearney MD

## 2024-12-16 NOTE — DISCHARGE INSTRUCTIONS
"Instrucciones Para Marguerite de Fanta    Instrucciones a Seguir    Dieta regular  Actividad: Aumentarntar gradualmente  Tila: Regadera o Dorinda  Restricciones: No levantar nada pesado  Cuidado Personal: No tampones o duchas vaginales  Actividad Sexual: Francesca relaciones sexuales  Planificacion Familiar: Consulta con olivera medico  Cuidado de los Senos: Use un sosten de soporte  Regresar al trabajo/escuela: Cuando olivera medico le indique    Cuando debe llamar al Doctor    *Fiebre de 100.4 o mas alto  *Nausea/Vomito persistente  *Secrecion de la incision  *Eliu sangrado vaginal o coagulos  *Inflamacion/dolor en los brazos o las piernas  *Severo dolor de jeanette, vision borrosa or desmayos  *Frequencia/ardor urinario  *Senales de depresion post-parto        La Depresion Post-Parto    Usted acaba de tener un brooke'.  A pesar de que sabe que deberia sentirse emocionada y bauman, lo que seinte son ganas de llorar sin motivo y tiene dificultades para realizar ed tareas diarias.  Usted pasa la mayor parte del tiempo annabel, cansada y desesperanzada, y hasta podria sentirse avergonzada o culpable.  Anaya lo que le esta sucediendo no es culpa suya, y puede sentirse mejor.  Hable con olivera medico para que la ayude.     La Depresion Despues del Parto    Ben vez sienta cansancio y ganas de llorar alfred despues de marguerite a tyler.  Esta etapa melancolica, denominada "baby blues", puede hacerla sentir annabel y desesperanzada, o temerosa de que algo radha le vaya a pasar al brooke.  Algunas mujeres hasta llegan a poner en vanda el que puedan ser buenas madres.    Que' es la Depresion?    La depresion es un trastorno del animo que afecta olivera manera de pernsar y de sentir.  El sintoma mas frecuente es un sentimiento de honda tristeza: tambien podria causane la sensacion de que ya no puede sobrellevar la josé miguel.    Otros sintomas incluyen:      * Ganar o perder mucho peso  * Dormir en exceso o demasiado poco  * Estar cansada todo el tiempo  * Sentirse inquieta  * " Tener miedo de querer herir al brooke'  * No tener interes por el brooke'  * Sentirse inutil o culpable   * No encontrar placer en las cosas que solia gustarle hacer  * Dificultades para pensar con claridad o jann decisiones  * Pensar sobre la muerte o el suicidio     Que' Causa la Depresion Postparto?    La causea exacta de la depresion postarto se desconce, aunque posiblemente es el resultado de los cambios hormonales que suceden anh y despues del parto.  Tambien puede deberse al cansancio que le causan las exigencias del brooke' y el proceso de adaptacion a olivera maternidad.  Todos estos factores podrian deprimirla.  En algunos casos, existe kayley predisposicion genetica a yony tipo de depresion.    La depresion puede tratarse    Lo saldaña es que hay muchas maneras de tratar la depresion postparo.  Emprenda el primer paso para sentirse mejor hablando con olivera medico.     Recursos    * National Institutes of Mental Health-- 745-297-4637     www.nimh.nih.gov    * National [place] on Mental Illness -- 937.266.9733     Www.tessy.org    * Mental Health [place] --  639.777.7612     Www.[facility].org    * National Suidide Hotline -- 443.696.3161    4493-5641 [employer]  Todos los derechos reservados.  Esta informacion no pretende sustituir las atencion medica profesional.  Solo olivera medico puede diagnosticar y tratar un problema de gilbert.     Patient Discharge Instructions for Postpartum Women    Resume Regular Diet  Increase activity gradually, no heavy lifting  Shower  No tampons, douching or sexual intercourse.  Discuss birth control options with your physician.  Wear a support bra  Return to work/school when you've been cleared by a physician    Call your physician if     *Fever of 100.4 or higher  *Persistent nausea/ vomiting  *Incisional drainage  *Heavy vaginal bleeding or large clots (Heavy bleeding is soaking 1 pad in an hour)  *Swelling and pain in arms or legs  *Severe headaches, blurred vision or  "fainting  *Shortness of breath  *Frequency and burning with urination  *Signs of postpartum depression, discuss these signs with your physician    Call lactation services for questions regarding feeding, nipple and breast care, and general questions about lactation.  They can be reached at [phone]         Understanding Postpartum Depression    You've just had a baby.  You know you should be excited and happy.  But instead you find yourself crying for no reason.  You may have trouble coping with your daily tasks.  You feel sad, tired, and hopeless most of the time.  You may even feel ashamed or guilty.  But what you're going through is not your fault and you can feel better.  Talk to your doctor.  He or she can help.    Depression After Childbirth    You may be weepy and tired right after giving birth.  These feelings are normal.  They're sometimes called the "baby blues."  These blues go away 2-3 weeks.  However, postpartum (meaning "after birth") depression lasts much longer and is more sever than the "baby blues."  It can make you feel sad and hopeless.  You may also fear that your baby will be harmed and worry about being a bad mother.      What is Depression?    Depression is a mood disorder that affects the way you think and feel.  The most common symptom is a feeling of deep sadness.  You may also feel as if you just can't cope with life.    Other symptoms include:      * Gaining or loosing weight  * Sleeping too much or too little  * Feeling tired all the time  * Feeling restless  * Fears of harming your baby   * Lack of interest in your baby  * Feeling worthless or guilty  * No longer finding pleasure in things you used to  * Having trouble thinking clearly or making decisions  * Thoughts of hurting yourself or your baby    What Causes Postpartum Depression    The exact causes of postpartum depression isn't known.  It may be due to changes in your hormones during and after childbirth.  You may also be " tired from caring for your baby and adjusting to being a mother.  All these factors may make you feel depressed.  In some cases, your genes may also play a role.    Depression Can Be Treated    The good news is that there are many ways to treat postpartum depression.  Talking to your doctor is the first step toward feeling better.    Resources:    * National [place] of Mental Health  -- 543.594.4876    www.nimh.nih.gov    * National [place] on Mental Illness --908.780.6783    Www.tessy.org    * Mental Health [place] -- 831.670.6757     Www.[facility].org    * National Suicide Hotline --169.614.2864 (800-SUICIDE)    1323-1306 [employer]  All rights reserved.  This information is not intended as a substitute for professional medical care.  Always follow up with your healthcare professional's instructions.

## 2024-12-17 VITALS
BODY MASS INDEX: 29.02 KG/M2 | SYSTOLIC BLOOD PRESSURE: 124 MMHG | TEMPERATURE: 98 F | DIASTOLIC BLOOD PRESSURE: 83 MMHG | RESPIRATION RATE: 16 BRPM | WEIGHT: 138.25 LBS | OXYGEN SATURATION: 98 % | HEIGHT: 58 IN | HEART RATE: 85 BPM

## 2024-12-17 PROBLEM — Z34.90 PREGNANCY: Status: ACTIVE | Noted: 2024-12-17

## 2024-12-17 PROCEDURE — 90471 IMMUNIZATION ADMIN: CPT | Performed by: OBSTETRICS & GYNECOLOGY

## 2024-12-17 PROCEDURE — 3E0134Z INTRODUCTION OF SERUM, TOXOID AND VACCINE INTO SUBCUTANEOUS TISSUE, PERCUTANEOUS APPROACH: ICD-10-PCS | Performed by: OBSTETRICS & GYNECOLOGY

## 2024-12-17 PROCEDURE — 90656 IIV3 VACC NO PRSV 0.5 ML IM: CPT | Performed by: OBSTETRICS & GYNECOLOGY

## 2024-12-17 PROCEDURE — 25000003 PHARM REV CODE 250: Performed by: OBSTETRICS & GYNECOLOGY

## 2024-12-17 PROCEDURE — 63600175 PHARM REV CODE 636 W HCPCS: Performed by: OBSTETRICS & GYNECOLOGY

## 2024-12-17 PROCEDURE — 63600175 PHARM REV CODE 636 W HCPCS: Mod: JZ,JG | Performed by: OBSTETRICS & GYNECOLOGY

## 2024-12-17 PROCEDURE — 3E02340 INTRODUCTION OF INFLUENZA VACCINE INTO MUSCLE, PERCUTANEOUS APPROACH: ICD-10-PCS | Performed by: OBSTETRICS & GYNECOLOGY

## 2024-12-17 PROCEDURE — 90710 MMRV VACCINE SC: CPT | Mod: JZ,JG | Performed by: OBSTETRICS & GYNECOLOGY

## 2024-12-17 PROCEDURE — 99238 HOSP IP/OBS DSCHRG MGMT 30/<: CPT | Mod: ,,, | Performed by: OBSTETRICS & GYNECOLOGY

## 2024-12-17 PROCEDURE — 90472 IMMUNIZATION ADMIN EACH ADD: CPT | Performed by: OBSTETRICS & GYNECOLOGY

## 2024-12-17 RX ORDER — MEDROXYPROGESTERONE ACETATE 150 MG/ML
150 INJECTION, SUSPENSION INTRAMUSCULAR ONCE
Status: DISCONTINUED | OUTPATIENT
Start: 2024-12-17 | End: 2024-12-17

## 2024-12-17 RX ORDER — MEDROXYPROGESTERONE ACETATE 150 MG/ML
150 INJECTION, SUSPENSION INTRAMUSCULAR ONCE
Status: COMPLETED | OUTPATIENT
Start: 2024-12-17 | End: 2024-12-17

## 2024-12-17 RX ORDER — IBUPROFEN 800 MG/1
800 TABLET ORAL 3 TIMES DAILY PRN
Qty: 30 TABLET | Refills: 0 | Status: SHIPPED | OUTPATIENT
Start: 2024-12-17

## 2024-12-17 RX ADMIN — MEASLES, MUMPS, AND RUBELLA VIRUS VACCINE LIVE 0.5 ML: 1000; 12500; 1000 INJECTION, POWDER, LYOPHILIZED, FOR SUSPENSION SUBCUTANEOUS at 10:12

## 2024-12-17 RX ADMIN — ACETAMINOPHEN 650 MG: 325 TABLET ORAL at 12:12

## 2024-12-17 RX ADMIN — MEDROXYPROGESTERONE ACETATE 150 MG: 150 INJECTION, SUSPENSION INTRAMUSCULAR at 12:12

## 2024-12-17 RX ADMIN — INFLUENZA VIRUS VACCINE 0.5 ML: 15; 15; 15 SUSPENSION INTRAMUSCULAR at 10:12

## 2024-12-17 RX ADMIN — IBUPROFEN 600 MG: 600 TABLET, FILM COATED ORAL at 12:12

## 2024-12-17 RX ADMIN — IBUPROFEN 600 MG: 600 TABLET, FILM COATED ORAL at 06:12

## 2024-12-17 RX ADMIN — PRENATAL VIT W/ FE FUMARATE-FA TAB 27-0.8 MG 1 TABLET: 27-0.8 TAB at 08:12

## 2024-12-17 RX ADMIN — ACETAMINOPHEN 650 MG: 325 TABLET ORAL at 06:12

## 2024-12-17 NOTE — PLAN OF CARE
Vss, nad. Poc reviewed with patient using  Mao #[id number], verbalized understanding and acceptance. Pain adequately controlled with prescribed medications. Ambulating freely around room. Questions encouraged and answered.

## 2024-12-17 NOTE — DISCHARGE SUMMARY
Delivery Discharge Summary  Obstetrics      Primary OB Clinician: Dr. Jhoan Thorne     Admission date: 2024  Discharge date: 2024    Admit Dx:   Patient Active Problem List   Diagnosis    Pre-eclampsia in third trimester    Pregnancy     Discharge Dx:   Patient Active Problem List   Diagnosis    Pre-eclampsia in third trimester    Pregnancy       Procedure:     Hospital Course:  Pt is a 24 y.o. now  by , POD # 2 was admitted on 2024. On initial assessment, vital signs were stable and physical exam was Normal.  Patient was subsequently admitted to labor and delivery unit with signed consents.  Patient subsequently delivered a viable infant, please see delivery information below or full delivery note for further details. Pt was in stable condition post delivery and was transferred to the Mother-Baby Unit in a stable condition. Her postpartum course was uncomplicated. On discharge day, patient's pain is well controlled with oral pain medications. Pt is tolerating ambulation without SOB or CP, and PO diet without N/V. Reports lochia is minimal in degree. Denies any HA, vision changes, F/C, LE swelling. Denies any breast pain/soreness.    Pt in stable condition and ready for discharge, instructed to continue PNV, Iron supplement, and to follow up in the OB clinic in 4-6 weeks with Dr. Thorne.     [place] #[id number] utilized for this encounter.       Delivery:    Episiotomy: None   Lacerations: 2nd   Repair suture: None   Repair # of packets:     Blood loss (ml):       Birth information:  YOB: 2022   Time of birth: 12:03 PM   Sex: male   Delivery type: Vaginal, Spontaneous   Gestational Age: 38w4d     Measurements    Weight: 2620 g  Weight (lbs): 5 lb 12.4 oz  Length:          Delivery Clinician: Delivery Providers    Delivering clinician: Alexx Caballero MD   Provider Role    Jyoti Howard RN Ross, Lydia, RN Maureau, Linda F, RN      "Calli Winston RN Day, SumanthST needelia              Additional  information:  Forceps:    Vacuum:    Breech:    Observed anomalies      Living?:     Apgars    Living status: Living  Apgar Component Scores:  1 min.:  5 min.:  10 min.:  15 min.:  20 min.:    Skin color:  0  1       Heart rate:  2  2       Reflex irritability:  2  2       Muscle tone:  2  2       Respiratory effort:  2  2       Total:  8  9       Apgars assigned by: ARIC KWOK RN         Placenta: Delivered:       appearance        Subjective: No complaints. Positive flatus. Ambulating, voiding, anali. Reg diet  Objective: /83 (BP Location: Left arm, Patient Position: Sitting)   Pulse 85   Temp 97.5 °F (36.4 °C) (Oral)   Resp 16   Ht 4' 10" (1.473 m)   Wt 62.7 kg (138 lb 3.7 oz)   SpO2 98%   Breastfeeding Unknown   BMI 28.89 kg/m²       Physical Exam  Chest: Clear to auscultation  CV: Regular rate and rhythm  Abdomen: Non-tender, non-distended, soft, positive bowel sounds  uterine fundus below umbilicus   Extremities: Non-tender, no edema        Patient Instructions:   Current Discharge Medication List        START taking these medications    Details   ibuprofen (ADVIL,MOTRIN) 800 MG tablet Take 1 tablet (800 mg total) by mouth 3 (three) times daily as needed for Pain.  Qty: 30 tablet, Refills: 0           CONTINUE these medications which have NOT CHANGED    Details   PNV 11-iron fum-folic acid-om3 28 mg iron-1 mg -200 mg Cap Take 1 tablet by mouth once daily.  Qty: 90 each, Refills: 4             Discharge Procedure Orders   Notify your health care provider if you experience any of the following:  temperature >100.4     Notify your health care provider if you experience any of the following:  persistent nausea and vomiting or diarrhea     Notify your health care provider if you experience any of the following:  severe uncontrolled pain     Notify your health care provider if you experience any of the following:  difficulty breathing " or increased cough     Notify your health care provider if you experience any of the following:  severe persistent headache     Notify your health care provider if you experience any of the following:  persistent dizziness, light-headedness, or visual disturbances     Notify your health care provider if you experience any of the following:  increased confusion or weakness     Activity as tolerated         Plan  D/C pt. To home in stable condition  Reg diet  Ad mari activity with pelvic rest x 6 wks  Call for fever >101, heavy vag bleeding, pain uncontrolled w/ pain meds  Follow up in the OB clinic in 4-6 weeks with Dr. Thorne  Motrin 800 mg TID PRN on discharge         Preeti Ventura MD, MPH  [facility] PGY-3  12/17/2024     06-Jan-2021 23:34

## 2024-12-18 ENCOUNTER — TELEPHONE (OUTPATIENT)
Dept: OBSTETRICS AND GYNECOLOGY | Facility: CLINIC | Age: 24
End: 2024-12-18
Payer: MEDICAID

## 2024-12-18 LAB — BACTERIA UR CULT: ABNORMAL

## 2024-12-18 RX ORDER — AMPICILLIN 500 MG/1
500 CAPSULE ORAL EVERY 6 HOURS
Qty: 20 CAPSULE | Refills: 0 | Status: SHIPPED | OUTPATIENT
Start: 2024-12-18

## 2024-12-18 NOTE — ADDENDUM NOTE
Addendum  created 12/18/24 1247 by James Anguiano MD    Attestation recorded in Intraprocedure, Intraprocedure Attestations filed

## 2024-12-19 LAB
FINAL PATHOLOGIC DIAGNOSIS: NORMAL
GROSS: NORMAL
Lab: NORMAL

## 2024-12-19 NOTE — TELEPHONE ENCOUNTER
Spoke with pt and informed uti , antibiotics were sent to [facility] for her to take them.     Pt verbalized understanding

## 2025-01-07 ENCOUNTER — HOSPITAL ENCOUNTER (OUTPATIENT)
Facility: HOSPITAL | Age: 25
Discharge: HOME | End: 2025-01-08
Attending: EMERGENCY MEDICINE | Admitting: SURGERY
Payer: MEDICAID

## 2025-01-07 DIAGNOSIS — N39.0 URINARY TRACT INFECTION WITHOUT HEMATURIA, SITE UNSPECIFIED: ICD-10-CM

## 2025-01-07 DIAGNOSIS — R10.11 RUQ ABDOMINAL PAIN: Primary | ICD-10-CM

## 2025-01-07 DIAGNOSIS — K80.20 GALLSTONES: ICD-10-CM

## 2025-01-07 LAB
ALBUMIN SERPL BCP-MCNC: 3.7 G/DL (ref 3.5–5.2)
ALP SERPL-CCNC: 214 U/L (ref 40–150)
ALT SERPL W/O P-5'-P-CCNC: 78 U/L (ref 10–44)
ANION GAP SERPL CALC-SCNC: 11 MMOL/L (ref 8–16)
AST SERPL-CCNC: 90 U/L (ref 10–40)
B-HCG UR QL: NEGATIVE
BACTERIA #/AREA URNS HPF: ABNORMAL /HPF
BASOPHILS # BLD AUTO: 0.04 K/UL (ref 0–0.2)
BASOPHILS NFR BLD: 0.4 % (ref 0–1.9)
BILIRUB SERPL-MCNC: 0.8 MG/DL (ref 0.1–1)
BILIRUB UR QL STRIP: NEGATIVE
BUN SERPL-MCNC: 8 MG/DL (ref 6–20)
CALCIUM SERPL-MCNC: 8.8 MG/DL (ref 8.7–10.5)
CHLORIDE SERPL-SCNC: 108 MMOL/L (ref 95–110)
CLARITY UR: CLEAR
CO2 SERPL-SCNC: 16 MMOL/L (ref 23–29)
COLOR UR: COLORLESS
CREAT SERPL-MCNC: 0.7 MG/DL (ref 0.5–1.4)
CTP QC/QA: YES
DIFFERENTIAL METHOD BLD: ABNORMAL
EOSINOPHIL # BLD AUTO: 0.1 K/UL (ref 0–0.5)
EOSINOPHIL NFR BLD: 0.5 % (ref 0–8)
ERYTHROCYTE [DISTWIDTH] IN BLOOD BY AUTOMATED COUNT: 16 % (ref 11.5–14.5)
EST. GFR  (NO RACE VARIABLE): >60 ML/MIN/1.73 M^2
GLUCOSE SERPL-MCNC: 96 MG/DL (ref 70–110)
GLUCOSE UR QL STRIP: NEGATIVE
HCT VFR BLD AUTO: 34.5 % (ref 37–48.5)
HGB BLD-MCNC: 11.2 G/DL (ref 12–16)
HGB UR QL STRIP: ABNORMAL
IMM GRANULOCYTES # BLD AUTO: 0.03 K/UL (ref 0–0.04)
IMM GRANULOCYTES NFR BLD AUTO: 0.3 % (ref 0–0.5)
KETONES UR QL STRIP: NEGATIVE
LEUKOCYTE ESTERASE UR QL STRIP: ABNORMAL
LIPASE SERPL-CCNC: 41 U/L (ref 4–60)
LYMPHOCYTES # BLD AUTO: 1.4 K/UL (ref 1–4.8)
LYMPHOCYTES NFR BLD: 12.4 % (ref 18–48)
MCH RBC QN AUTO: 25.8 PG (ref 27–31)
MCHC RBC AUTO-ENTMCNC: 32.5 G/DL (ref 32–36)
MCV RBC AUTO: 80 FL (ref 82–98)
MICROSCOPIC COMMENT: ABNORMAL
MONOCYTES # BLD AUTO: 0.6 K/UL (ref 0.3–1)
MONOCYTES NFR BLD: 5.4 % (ref 4–15)
NEUTROPHILS # BLD AUTO: 9 K/UL (ref 1.8–7.7)
NEUTROPHILS NFR BLD: 81 % (ref 38–73)
NITRITE UR QL STRIP: NEGATIVE
NRBC BLD-RTO: 0 /100 WBC
PH UR STRIP: 6 [PH] (ref 5–8)
PLATELET # BLD AUTO: 359 K/UL (ref 150–450)
PMV BLD AUTO: 9.2 FL (ref 9.2–12.9)
POTASSIUM SERPL-SCNC: 3.4 MMOL/L (ref 3.5–5.1)
PROT SERPL-MCNC: 7.6 G/DL (ref 6–8.4)
PROT UR QL STRIP: NEGATIVE
RBC # BLD AUTO: 4.34 M/UL (ref 4–5.4)
RBC #/AREA URNS HPF: 9 /HPF (ref 0–4)
SODIUM SERPL-SCNC: 135 MMOL/L (ref 136–145)
SP GR UR STRIP: 1 (ref 1–1.03)
SQUAMOUS #/AREA URNS HPF: 0 /HPF
URN SPEC COLLECT METH UR: ABNORMAL
UROBILINOGEN UR STRIP-ACNC: NEGATIVE EU/DL
WBC # BLD AUTO: 11.04 K/UL (ref 3.9–12.7)
WBC #/AREA URNS HPF: 51 /HPF (ref 0–5)

## 2025-01-07 PROCEDURE — 96361 HYDRATE IV INFUSION ADD-ON: CPT

## 2025-01-07 PROCEDURE — 63600175 PHARM REV CODE 636 W HCPCS

## 2025-01-07 PROCEDURE — 83690 ASSAY OF LIPASE: CPT | Performed by: EMERGENCY MEDICINE

## 2025-01-07 PROCEDURE — 81000 URINALYSIS NONAUTO W/SCOPE: CPT | Performed by: EMERGENCY MEDICINE

## 2025-01-07 PROCEDURE — 25000003 PHARM REV CODE 250: Performed by: EMERGENCY MEDICINE

## 2025-01-07 PROCEDURE — 87086 URINE CULTURE/COLONY COUNT: CPT | Performed by: EMERGENCY MEDICINE

## 2025-01-07 PROCEDURE — 96375 TX/PRO/DX INJ NEW DRUG ADDON: CPT

## 2025-01-07 PROCEDURE — 85025 COMPLETE CBC W/AUTO DIFF WBC: CPT | Performed by: EMERGENCY MEDICINE

## 2025-01-07 PROCEDURE — G0378 HOSPITAL OBSERVATION PER HR: HCPCS

## 2025-01-07 PROCEDURE — 80053 COMPREHEN METABOLIC PANEL: CPT | Performed by: EMERGENCY MEDICINE

## 2025-01-07 PROCEDURE — 96365 THER/PROPH/DIAG IV INF INIT: CPT

## 2025-01-07 PROCEDURE — 63600175 PHARM REV CODE 636 W HCPCS: Performed by: EMERGENCY MEDICINE

## 2025-01-07 PROCEDURE — 99285 EMERGENCY DEPT VISIT HI MDM: CPT | Mod: 25

## 2025-01-07 PROCEDURE — 81025 URINE PREGNANCY TEST: CPT | Performed by: EMERGENCY MEDICINE

## 2025-01-07 RX ORDER — SODIUM CHLORIDE, SODIUM LACTATE, POTASSIUM CHLORIDE, CALCIUM CHLORIDE 600; 310; 30; 20 MG/100ML; MG/100ML; MG/100ML; MG/100ML
INJECTION, SOLUTION INTRAVENOUS CONTINUOUS
Status: DISCONTINUED | OUTPATIENT
Start: 2025-01-07 | End: 2025-01-08 | Stop reason: HOSPADM

## 2025-01-07 RX ORDER — MORPHINE SULFATE 4 MG/ML
4 INJECTION, SOLUTION INTRAMUSCULAR; INTRAVENOUS
Status: COMPLETED | OUTPATIENT
Start: 2025-01-07 | End: 2025-01-07

## 2025-01-07 RX ORDER — ONDANSETRON HYDROCHLORIDE 2 MG/ML
4 INJECTION, SOLUTION INTRAVENOUS
Status: COMPLETED | OUTPATIENT
Start: 2025-01-07 | End: 2025-01-07

## 2025-01-07 RX ORDER — ALUMINUM HYDROXIDE, MAGNESIUM HYDROXIDE, AND SIMETHICONE 1200; 120; 1200 MG/30ML; MG/30ML; MG/30ML
30 SUSPENSION ORAL ONCE
Status: COMPLETED | OUTPATIENT
Start: 2025-01-07 | End: 2025-01-07

## 2025-01-07 RX ORDER — ACETAMINOPHEN 325 MG/1
650 TABLET ORAL EVERY 6 HOURS PRN
Status: DISCONTINUED | OUTPATIENT
Start: 2025-01-07 | End: 2025-01-08 | Stop reason: HOSPADM

## 2025-01-07 RX ORDER — LIDOCAINE HYDROCHLORIDE 20 MG/ML
15 SOLUTION OROPHARYNGEAL ONCE
Status: COMPLETED | OUTPATIENT
Start: 2025-01-07 | End: 2025-01-07

## 2025-01-07 RX ADMIN — ONDANSETRON 4 MG: 2 INJECTION INTRAMUSCULAR; INTRAVENOUS at 06:01

## 2025-01-07 RX ADMIN — LIDOCAINE HYDROCHLORIDE 15 ML: 20 SOLUTION ORAL at 06:01

## 2025-01-07 RX ADMIN — PIPERACILLIN AND TAZOBACTAM 4.5 G: 4; .5 INJECTION, POWDER, LYOPHILIZED, FOR SOLUTION INTRAVENOUS; PARENTERAL at 07:01

## 2025-01-07 RX ADMIN — MORPHINE SULFATE 4 MG: 4 INJECTION INTRAVENOUS at 06:01

## 2025-01-07 RX ADMIN — ALUMINUM HYDROXIDE, MAGNESIUM HYDROXIDE, AND SIMETHICONE 30 ML: 200; 200; 20 SUSPENSION ORAL at 06:01

## 2025-01-07 RX ADMIN — SODIUM CHLORIDE 1000 ML: 9 INJECTION, SOLUTION INTRAVENOUS at 06:01

## 2025-01-07 RX ADMIN — SODIUM CHLORIDE, POTASSIUM CHLORIDE, SODIUM LACTATE AND CALCIUM CHLORIDE: 600; 310; 30; 20 INJECTION, SOLUTION INTRAVENOUS at 10:01

## 2025-01-08 ENCOUNTER — ANESTHESIA (OUTPATIENT)
Dept: SURGERY | Facility: HOSPITAL | Age: 25
End: 2025-01-08
Payer: MEDICAID

## 2025-01-08 ENCOUNTER — ANESTHESIA EVENT (OUTPATIENT)
Dept: SURGERY | Facility: HOSPITAL | Age: 25
End: 2025-01-08
Payer: MEDICAID

## 2025-01-08 VITALS
RESPIRATION RATE: 18 BRPM | OXYGEN SATURATION: 92 % | SYSTOLIC BLOOD PRESSURE: 131 MMHG | TEMPERATURE: 98 F | DIASTOLIC BLOOD PRESSURE: 81 MMHG | BODY MASS INDEX: 26.06 KG/M2 | HEIGHT: 61 IN | WEIGHT: 138 LBS | HEART RATE: 108 BPM

## 2025-01-08 PROBLEM — K80.20 GALLSTONES: Status: ACTIVE | Noted: 2025-01-08

## 2025-01-08 LAB
ALBUMIN SERPL BCP-MCNC: 3.2 G/DL (ref 3.5–5.2)
ALBUMIN SERPL BCP-MCNC: 3.2 G/DL (ref 3.5–5.2)
ALP SERPL-CCNC: 217 U/L (ref 40–150)
ALP SERPL-CCNC: 217 U/L (ref 40–150)
ALT SERPL W/O P-5'-P-CCNC: 113 U/L (ref 10–44)
ALT SERPL W/O P-5'-P-CCNC: 113 U/L (ref 10–44)
ANION GAP SERPL CALC-SCNC: 10 MMOL/L (ref 8–16)
ANION GAP SERPL CALC-SCNC: 10 MMOL/L (ref 8–16)
AST SERPL-CCNC: 94 U/L (ref 10–40)
AST SERPL-CCNC: 94 U/L (ref 10–40)
BACTERIA UR CULT: NORMAL
BACTERIA UR CULT: NORMAL
BASOPHILS # BLD AUTO: 0.06 K/UL (ref 0–0.2)
BASOPHILS # BLD AUTO: 0.06 K/UL (ref 0–0.2)
BASOPHILS NFR BLD: 1 % (ref 0–1.9)
BASOPHILS NFR BLD: 1 % (ref 0–1.9)
BILIRUB SERPL-MCNC: 1 MG/DL (ref 0.1–1)
BILIRUB SERPL-MCNC: 1 MG/DL (ref 0.1–1)
BUN SERPL-MCNC: 7 MG/DL (ref 6–20)
BUN SERPL-MCNC: 7 MG/DL (ref 6–20)
CALCIUM SERPL-MCNC: 9 MG/DL (ref 8.7–10.5)
CALCIUM SERPL-MCNC: 9 MG/DL (ref 8.7–10.5)
CHLORIDE SERPL-SCNC: 113 MMOL/L (ref 95–110)
CHLORIDE SERPL-SCNC: 113 MMOL/L (ref 95–110)
CO2 SERPL-SCNC: 19 MMOL/L (ref 23–29)
CO2 SERPL-SCNC: 19 MMOL/L (ref 23–29)
CREAT SERPL-MCNC: 0.7 MG/DL (ref 0.5–1.4)
CREAT SERPL-MCNC: 0.7 MG/DL (ref 0.5–1.4)
DIFFERENTIAL METHOD BLD: ABNORMAL
DIFFERENTIAL METHOD BLD: ABNORMAL
EOSINOPHIL # BLD AUTO: 0.2 K/UL (ref 0–0.5)
EOSINOPHIL # BLD AUTO: 0.2 K/UL (ref 0–0.5)
EOSINOPHIL NFR BLD: 2.6 % (ref 0–8)
EOSINOPHIL NFR BLD: 2.6 % (ref 0–8)
ERYTHROCYTE [DISTWIDTH] IN BLOOD BY AUTOMATED COUNT: 17 % (ref 11.5–14.5)
ERYTHROCYTE [DISTWIDTH] IN BLOOD BY AUTOMATED COUNT: 17 % (ref 11.5–14.5)
EST. GFR  (NO RACE VARIABLE): >60 ML/MIN/1.73 M^2
EST. GFR  (NO RACE VARIABLE): >60 ML/MIN/1.73 M^2
GLUCOSE SERPL-MCNC: 89 MG/DL (ref 70–110)
GLUCOSE SERPL-MCNC: 89 MG/DL (ref 70–110)
HCT VFR BLD AUTO: 34.4 % (ref 37–48.5)
HCT VFR BLD AUTO: 34.4 % (ref 37–48.5)
HGB BLD-MCNC: 11 G/DL (ref 12–16)
HGB BLD-MCNC: 11 G/DL (ref 12–16)
IMM GRANULOCYTES # BLD AUTO: 0.02 K/UL (ref 0–0.04)
IMM GRANULOCYTES # BLD AUTO: 0.02 K/UL (ref 0–0.04)
IMM GRANULOCYTES NFR BLD AUTO: 0.3 % (ref 0–0.5)
IMM GRANULOCYTES NFR BLD AUTO: 0.3 % (ref 0–0.5)
LYMPHOCYTES # BLD AUTO: 2 K/UL (ref 1–4.8)
LYMPHOCYTES # BLD AUTO: 2 K/UL (ref 1–4.8)
LYMPHOCYTES NFR BLD: 35.3 % (ref 18–48)
LYMPHOCYTES NFR BLD: 35.3 % (ref 18–48)
MAGNESIUM SERPL-MCNC: 1.9 MG/DL (ref 1.6–2.6)
MCH RBC QN AUTO: 25.6 PG (ref 27–31)
MCH RBC QN AUTO: 25.6 PG (ref 27–31)
MCHC RBC AUTO-ENTMCNC: 32 G/DL (ref 32–36)
MCHC RBC AUTO-ENTMCNC: 32 G/DL (ref 32–36)
MCV RBC AUTO: 80 FL (ref 82–98)
MCV RBC AUTO: 80 FL (ref 82–98)
MONOCYTES # BLD AUTO: 0.7 K/UL (ref 0.3–1)
MONOCYTES # BLD AUTO: 0.7 K/UL (ref 0.3–1)
MONOCYTES NFR BLD: 11.5 % (ref 4–15)
MONOCYTES NFR BLD: 11.5 % (ref 4–15)
NEUTROPHILS # BLD AUTO: 2.8 K/UL (ref 1.8–7.7)
NEUTROPHILS # BLD AUTO: 2.8 K/UL (ref 1.8–7.7)
NEUTROPHILS NFR BLD: 49.3 % (ref 38–73)
NEUTROPHILS NFR BLD: 49.3 % (ref 38–73)
NRBC BLD-RTO: 0 /100 WBC
NRBC BLD-RTO: 0 /100 WBC
PHOSPHATE SERPL-MCNC: 3.9 MG/DL (ref 2.7–4.5)
PLATELET # BLD AUTO: 342 K/UL (ref 150–450)
PLATELET # BLD AUTO: 342 K/UL (ref 150–450)
PMV BLD AUTO: 9 FL (ref 9.2–12.9)
PMV BLD AUTO: 9 FL (ref 9.2–12.9)
POTASSIUM SERPL-SCNC: 3.7 MMOL/L (ref 3.5–5.1)
POTASSIUM SERPL-SCNC: 3.7 MMOL/L (ref 3.5–5.1)
PROT SERPL-MCNC: 6.7 G/DL (ref 6–8.4)
PROT SERPL-MCNC: 6.7 G/DL (ref 6–8.4)
RBC # BLD AUTO: 4.3 M/UL (ref 4–5.4)
RBC # BLD AUTO: 4.3 M/UL (ref 4–5.4)
SODIUM SERPL-SCNC: 142 MMOL/L (ref 136–145)
SODIUM SERPL-SCNC: 142 MMOL/L (ref 136–145)
WBC # BLD AUTO: 5.75 K/UL (ref 3.9–12.7)
WBC # BLD AUTO: 5.75 K/UL (ref 3.9–12.7)

## 2025-01-08 PROCEDURE — 88304 TISSUE EXAM BY PATHOLOGIST: CPT | Performed by: STUDENT IN AN ORGANIZED HEALTH CARE EDUCATION/TRAINING PROGRAM

## 2025-01-08 PROCEDURE — 88304 TISSUE EXAM BY PATHOLOGIST: CPT | Mod: 26,,, | Performed by: STUDENT IN AN ORGANIZED HEALTH CARE EDUCATION/TRAINING PROGRAM

## 2025-01-08 PROCEDURE — 83735 ASSAY OF MAGNESIUM: CPT

## 2025-01-08 PROCEDURE — 96375 TX/PRO/DX INJ NEW DRUG ADDON: CPT

## 2025-01-08 PROCEDURE — 36000710: Performed by: SURGERY

## 2025-01-08 PROCEDURE — 63600175 PHARM REV CODE 636 W HCPCS: Performed by: EMERGENCY MEDICINE

## 2025-01-08 PROCEDURE — 96376 TX/PRO/DX INJ SAME DRUG ADON: CPT

## 2025-01-08 PROCEDURE — 80053 COMPREHEN METABOLIC PANEL: CPT | Performed by: EMERGENCY MEDICINE

## 2025-01-08 PROCEDURE — 96366 THER/PROPH/DIAG IV INF ADDON: CPT

## 2025-01-08 PROCEDURE — 71000039 HC RECOVERY, EACH ADD'L HOUR: Performed by: SURGERY

## 2025-01-08 PROCEDURE — 63600175 PHARM REV CODE 636 W HCPCS: Performed by: NURSE ANESTHETIST, CERTIFIED REGISTERED

## 2025-01-08 PROCEDURE — 36415 COLL VENOUS BLD VENIPUNCTURE: CPT | Performed by: EMERGENCY MEDICINE

## 2025-01-08 PROCEDURE — 25000003 PHARM REV CODE 250: Performed by: EMERGENCY MEDICINE

## 2025-01-08 PROCEDURE — 99222 1ST HOSP IP/OBS MODERATE 55: CPT | Mod: 57,,, | Performed by: SURGERY

## 2025-01-08 PROCEDURE — 85025 COMPLETE CBC W/AUTO DIFF WBC: CPT | Performed by: EMERGENCY MEDICINE

## 2025-01-08 PROCEDURE — 25000003 PHARM REV CODE 250: Performed by: NURSE ANESTHETIST, CERTIFIED REGISTERED

## 2025-01-08 PROCEDURE — 63600175 PHARM REV CODE 636 W HCPCS: Performed by: SURGERY

## 2025-01-08 PROCEDURE — G0378 HOSPITAL OBSERVATION PER HR: HCPCS

## 2025-01-08 PROCEDURE — 71000033 HC RECOVERY, INTIAL HOUR: Performed by: SURGERY

## 2025-01-08 PROCEDURE — 47562 LAPAROSCOPIC CHOLECYSTECTOMY: CPT | Mod: ,,, | Performed by: SURGERY

## 2025-01-08 PROCEDURE — 84100 ASSAY OF PHOSPHORUS: CPT

## 2025-01-08 PROCEDURE — 37000009 HC ANESTHESIA EA ADD 15 MINS: Performed by: SURGERY

## 2025-01-08 PROCEDURE — 27201423 OPTIME MED/SURG SUP & DEVICES STERILE SUPPLY: Performed by: SURGERY

## 2025-01-08 PROCEDURE — 63600175 PHARM REV CODE 636 W HCPCS: Mod: TB | Performed by: ANESTHESIOLOGY

## 2025-01-08 PROCEDURE — 36000711: Performed by: SURGERY

## 2025-01-08 PROCEDURE — 96361 HYDRATE IV INFUSION ADD-ON: CPT

## 2025-01-08 PROCEDURE — 25000003 PHARM REV CODE 250: Performed by: ANESTHESIOLOGY

## 2025-01-08 PROCEDURE — 37000008 HC ANESTHESIA 1ST 15 MINUTES: Performed by: SURGERY

## 2025-01-08 PROCEDURE — 63600175 PHARM REV CODE 636 W HCPCS: Mod: TB | Performed by: NURSE ANESTHETIST, CERTIFIED REGISTERED

## 2025-01-08 RX ORDER — ACETAMINOPHEN 325 MG/1
650 TABLET ORAL EVERY 8 HOURS PRN
Status: DISCONTINUED | OUTPATIENT
Start: 2025-01-08 | End: 2025-01-08 | Stop reason: HOSPADM

## 2025-01-08 RX ORDER — MORPHINE SULFATE 2 MG/ML
4 INJECTION, SOLUTION INTRAMUSCULAR; INTRAVENOUS EVERY 4 HOURS PRN
Status: DISCONTINUED | OUTPATIENT
Start: 2025-01-08 | End: 2025-01-08 | Stop reason: HOSPADM

## 2025-01-08 RX ORDER — ONDANSETRON HYDROCHLORIDE 2 MG/ML
4 INJECTION, SOLUTION INTRAVENOUS EVERY 8 HOURS PRN
Status: DISCONTINUED | OUTPATIENT
Start: 2025-01-08 | End: 2025-01-08 | Stop reason: HOSPADM

## 2025-01-08 RX ORDER — PROCHLORPERAZINE EDISYLATE 5 MG/ML
5 INJECTION INTRAMUSCULAR; INTRAVENOUS EVERY 30 MIN PRN
Status: DISCONTINUED | OUTPATIENT
Start: 2025-01-08 | End: 2025-01-08 | Stop reason: HOSPADM

## 2025-01-08 RX ORDER — BUPIVACAINE HYDROCHLORIDE 5 MG/ML
INJECTION, SOLUTION PERINEURAL
Status: DISCONTINUED | OUTPATIENT
Start: 2025-01-08 | End: 2025-01-08 | Stop reason: HOSPADM

## 2025-01-08 RX ORDER — LIDOCAINE HYDROCHLORIDE 10 MG/ML
INJECTION, SOLUTION INFILTRATION; PERINEURAL
Status: DISCONTINUED | OUTPATIENT
Start: 2025-01-08 | End: 2025-01-08 | Stop reason: HOSPADM

## 2025-01-08 RX ORDER — FENTANYL CITRATE 50 UG/ML
INJECTION, SOLUTION INTRAMUSCULAR; INTRAVENOUS
Status: DISCONTINUED | OUTPATIENT
Start: 2025-01-08 | End: 2025-01-08

## 2025-01-08 RX ORDER — TALC
6 POWDER (GRAM) TOPICAL NIGHTLY PRN
Status: DISCONTINUED | OUTPATIENT
Start: 2025-01-08 | End: 2025-01-08 | Stop reason: HOSPADM

## 2025-01-08 RX ORDER — MORPHINE SULFATE 2 MG/ML
2 INJECTION, SOLUTION INTRAMUSCULAR; INTRAVENOUS EVERY 4 HOURS PRN
Status: DISCONTINUED | OUTPATIENT
Start: 2025-01-08 | End: 2025-01-08 | Stop reason: HOSPADM

## 2025-01-08 RX ORDER — HYDROMORPHONE HYDROCHLORIDE 2 MG/ML
0.2 INJECTION, SOLUTION INTRAMUSCULAR; INTRAVENOUS; SUBCUTANEOUS EVERY 5 MIN PRN
Status: DISCONTINUED | OUTPATIENT
Start: 2025-01-08 | End: 2025-01-08 | Stop reason: HOSPADM

## 2025-01-08 RX ORDER — SODIUM CHLORIDE, SODIUM LACTATE, POTASSIUM CHLORIDE, CALCIUM CHLORIDE 600; 310; 30; 20 MG/100ML; MG/100ML; MG/100ML; MG/100ML
INJECTION, SOLUTION INTRAVENOUS CONTINUOUS
Status: DISCONTINUED | OUTPATIENT
Start: 2025-01-08 | End: 2025-01-08 | Stop reason: HOSPADM

## 2025-01-08 RX ORDER — MIDAZOLAM HYDROCHLORIDE 1 MG/ML
INJECTION INTRAMUSCULAR; INTRAVENOUS
Status: DISCONTINUED | OUTPATIENT
Start: 2025-01-08 | End: 2025-01-08

## 2025-01-08 RX ORDER — ONDANSETRON HYDROCHLORIDE 2 MG/ML
4 INJECTION, SOLUTION INTRAVENOUS ONCE AS NEEDED
Status: DISCONTINUED | OUTPATIENT
Start: 2025-01-08 | End: 2025-01-08 | Stop reason: HOSPADM

## 2025-01-08 RX ORDER — ROCURONIUM BROMIDE 10 MG/ML
INJECTION, SOLUTION INTRAVENOUS
Status: DISCONTINUED | OUTPATIENT
Start: 2025-01-08 | End: 2025-01-08

## 2025-01-08 RX ORDER — ONDANSETRON HYDROCHLORIDE 2 MG/ML
INJECTION, SOLUTION INTRAVENOUS
Status: DISCONTINUED | OUTPATIENT
Start: 2025-01-08 | End: 2025-01-08

## 2025-01-08 RX ORDER — LIDOCAINE HYDROCHLORIDE 20 MG/ML
INJECTION INTRAVENOUS
Status: DISCONTINUED | OUTPATIENT
Start: 2025-01-08 | End: 2025-01-08

## 2025-01-08 RX ORDER — FAMOTIDINE 10 MG/ML
20 INJECTION INTRAVENOUS EVERY 12 HOURS
Status: DISCONTINUED | OUTPATIENT
Start: 2025-01-08 | End: 2025-01-08 | Stop reason: HOSPADM

## 2025-01-08 RX ORDER — KETOROLAC TROMETHAMINE 30 MG/ML
15 INJECTION, SOLUTION INTRAMUSCULAR; INTRAVENOUS EVERY 8 HOURS PRN
Status: DISCONTINUED | OUTPATIENT
Start: 2025-01-08 | End: 2025-01-08 | Stop reason: HOSPADM

## 2025-01-08 RX ORDER — ACETAMINOPHEN 325 MG/1
650 TABLET ORAL EVERY 6 HOURS PRN
COMMUNITY
Start: 2025-01-08

## 2025-01-08 RX ORDER — HYDROMORPHONE HYDROCHLORIDE 2 MG/ML
INJECTION, SOLUTION INTRAMUSCULAR; INTRAVENOUS; SUBCUTANEOUS
Status: DISCONTINUED | OUTPATIENT
Start: 2025-01-08 | End: 2025-01-08

## 2025-01-08 RX ORDER — DEXAMETHASONE SODIUM PHOSPHATE 4 MG/ML
INJECTION, SOLUTION INTRA-ARTICULAR; INTRALESIONAL; INTRAMUSCULAR; INTRAVENOUS; SOFT TISSUE
Status: DISCONTINUED | OUTPATIENT
Start: 2025-01-08 | End: 2025-01-08

## 2025-01-08 RX ORDER — GLUCAGON 1 MG
1 KIT INJECTION
Status: DISCONTINUED | OUTPATIENT
Start: 2025-01-08 | End: 2025-01-08 | Stop reason: HOSPADM

## 2025-01-08 RX ORDER — SODIUM CHLORIDE 0.9 % (FLUSH) 0.9 %
10 SYRINGE (ML) INJECTION
Status: DISCONTINUED | OUTPATIENT
Start: 2025-01-08 | End: 2025-01-08 | Stop reason: HOSPADM

## 2025-01-08 RX ORDER — OXYCODONE HYDROCHLORIDE 5 MG/1
5 TABLET ORAL
Status: DISCONTINUED | OUTPATIENT
Start: 2025-01-08 | End: 2025-01-08 | Stop reason: HOSPADM

## 2025-01-08 RX ORDER — OXYCODONE AND ACETAMINOPHEN 5; 325 MG/1; MG/1
1 TABLET ORAL EVERY 4 HOURS PRN
Status: CANCELLED | OUTPATIENT
Start: 2025-01-08

## 2025-01-08 RX ORDER — PROPOFOL 10 MG/ML
VIAL (ML) INTRAVENOUS
Status: DISCONTINUED | OUTPATIENT
Start: 2025-01-08 | End: 2025-01-08

## 2025-01-08 RX ADMIN — FAMOTIDINE 20 MG: 10 INJECTION, SOLUTION INTRAVENOUS at 12:01

## 2025-01-08 RX ADMIN — MIDAZOLAM HYDROCHLORIDE 2 MG: 1 INJECTION, SOLUTION INTRAMUSCULAR; INTRAVENOUS at 11:01

## 2025-01-08 RX ADMIN — HYDROMORPHONE HYDROCHLORIDE 0.2 MG: 2 INJECTION INTRAMUSCULAR; INTRAVENOUS; SUBCUTANEOUS at 01:01

## 2025-01-08 RX ADMIN — SODIUM CHLORIDE: 0.9 INJECTION, SOLUTION INTRAVENOUS at 11:01

## 2025-01-08 RX ADMIN — FENTANYL CITRATE 100 MCG: 50 INJECTION INTRAMUSCULAR; INTRAVENOUS at 11:01

## 2025-01-08 RX ADMIN — FAMOTIDINE 20 MG: 10 INJECTION, SOLUTION INTRAVENOUS at 09:01

## 2025-01-08 RX ADMIN — PROPOFOL 20 MG: 10 INJECTION, EMULSION INTRAVENOUS at 12:01

## 2025-01-08 RX ADMIN — ONDANSETRON 4 MG: 2 INJECTION, SOLUTION INTRAMUSCULAR; INTRAVENOUS at 11:01

## 2025-01-08 RX ADMIN — HYDROMORPHONE HYDROCHLORIDE 0.2 MG: 2 INJECTION INTRAMUSCULAR; INTRAVENOUS; SUBCUTANEOUS at 12:01

## 2025-01-08 RX ADMIN — PIPERACILLIN SODIUM AND TAZOBACTAM SODIUM 4.5 G: 4; .5 INJECTION, POWDER, LYOPHILIZED, FOR SOLUTION INTRAVENOUS at 06:01

## 2025-01-08 RX ADMIN — HYDROMORPHONE HYDROCHLORIDE 0.5 MG: 2 INJECTION INTRAMUSCULAR; INTRAVENOUS; SUBCUTANEOUS at 12:01

## 2025-01-08 RX ADMIN — HYDROMORPHONE HYDROCHLORIDE 0.4 MG: 2 INJECTION INTRAMUSCULAR; INTRAVENOUS; SUBCUTANEOUS at 12:01

## 2025-01-08 RX ADMIN — DEXAMETHASONE SODIUM PHOSPHATE 8 MG: 4 INJECTION, SOLUTION INTRA-ARTICULAR; INTRALESIONAL; INTRAMUSCULAR; INTRAVENOUS; SOFT TISSUE at 11:01

## 2025-01-08 RX ADMIN — LIDOCAINE HYDROCHLORIDE 100 MG: 20 INJECTION, SOLUTION INTRAVENOUS at 11:01

## 2025-01-08 RX ADMIN — SODIUM CHLORIDE, POTASSIUM CHLORIDE, SODIUM LACTATE AND CALCIUM CHLORIDE: 600; 310; 30; 20 INJECTION, SOLUTION INTRAVENOUS at 04:01

## 2025-01-08 RX ADMIN — OXYCODONE 5 MG: 5 TABLET ORAL at 01:01

## 2025-01-08 RX ADMIN — ONDANSETRON 4 MG: 2 INJECTION INTRAMUSCULAR; INTRAVENOUS at 04:01

## 2025-01-08 RX ADMIN — ROCURONIUM BROMIDE 50 MG: 10 INJECTION, SOLUTION INTRAVENOUS at 11:01

## 2025-01-08 RX ADMIN — SUGAMMADEX 200 MG: 100 INJECTION, SOLUTION INTRAVENOUS at 12:01

## 2025-01-08 RX ADMIN — ONDANSETRON 4 MG: 2 INJECTION, SOLUTION INTRAMUSCULAR; INTRAVENOUS at 12:01

## 2025-01-08 RX ADMIN — PROPOFOL 140 MG: 10 INJECTION, EMULSION INTRAVENOUS at 11:01

## 2025-01-08 NOTE — ED NOTES
Report received care assumed. Lying in bed awake, alert and oriented. Respiration's even and unlabored.  Pedro #[id number]. Patient is tearful due to recently having a baby, breast feeding and having to be admitted to the hospital. Patient do not have anyone to watch the baby overnight. Charge notified at bedside.

## 2025-01-08 NOTE — SUBJECTIVE & OBJECTIVE
No current facility-administered medications on file prior to encounter.     Current Outpatient Medications on File Prior to Encounter   Medication Sig    ampicillin (PRINCIPEN) 500 MG capsule Take 1 capsule (500 mg total) by mouth every 6 (six) hours.    ibuprofen (ADVIL,MOTRIN) 800 MG tablet Take 1 tablet (800 mg total) by mouth 3 (three) times daily as needed for Pain.    PNV 11-iron fum-folic acid-om3 28 mg iron-1 mg -200 mg Cap Take 1 tablet by mouth once daily.       Review of patient's allergies indicates:  No Known Allergies    History reviewed. No pertinent past medical history.  History reviewed. No pertinent surgical history.  Family History    None       Tobacco Use    Smoking status: Never    Smokeless tobacco: Never   Substance and Sexual Activity    Alcohol use: Not Currently    Drug use: Not Currently    Sexual activity: Yes     Partners: Male     Review of Systems   All other systems reviewed and are negative.    Objective:     Vital Signs (Most Recent):  Temp: 98.2 °F (36.8 °C) (01/08/25 0745)  Pulse: 73 (01/08/25 0745)  Resp: 17 (01/08/25 0745)  BP: 120/73 (01/08/25 0745)  SpO2: 98 % (01/08/25 0745) Vital Signs (24h Range):  Temp:  [98 °F (36.7 °C)-98.3 °F (36.8 °C)] 98.2 °F (36.8 °C)  Pulse:  [73-97] 73  Resp:  [17-20] 17  SpO2:  [98 %-100 %] 98 %  BP: (120-134)/(73-85) 120/73     Weight: 62.6 kg (138 lb)  Body mass index is 26.07 kg/m².     Physical Exam  Constitutional:       General: She is not in acute distress.     Appearance: Normal appearance.   HENT:      Head: Normocephalic and atraumatic.      Nose: Nose normal.      Mouth/Throat:      Mouth: Mucous membranes are moist.   Eyes:      Extraocular Movements: Extraocular movements intact.   Cardiovascular:      Rate and Rhythm: Normal rate.   Pulmonary:      Effort: Pulmonary effort is normal. No respiratory distress.   Abdominal:      Palpations: Abdomen is soft.      Comments: Mildly TTP   Musculoskeletal:         General: Normal range of  motion.      Cervical back: Normal range of motion.   Skin:     General: Skin is warm.      Coloration: Skin is not jaundiced.   Neurological:      General: No focal deficit present.      Mental Status: She is alert and oriented to person, place, and time.            I have reviewed all pertinent lab results within the past 24 hours.    Significant Diagnostics:  I have reviewed all pertinent imaging results/findings within the past 24 hours.

## 2025-01-08 NOTE — TRANSFER OF CARE
"Anesthesia Transfer of Care Note    Patient: Letty Hillman    Procedure(s) Performed: Procedure(s) (LRB):  ROBOTIC CHOLECYSTECTOMY (N/A)    Patient location: PACU    Anesthesia Type: general    Transport from OR: Transported from OR on 6-10 L/min O2 by face mask with adequate spontaneous ventilation    Post pain: adequate analgesia    Post assessment: no apparent anesthetic complications and tolerated procedure well    Post vital signs: stable    Level of consciousness: awake and alert    Nausea/Vomiting: no nausea/vomiting    Complications: none    Transfer of care protocol was followed      Last vitals: Visit Vitals  BP (!) 142/74   Pulse 100   Temp 36.7 °C (98.1 °F) (Skin)   Resp 18   Ht 5' 1" (1.549 m)   Wt 62.6 kg (138 lb)   SpO2 100%   Breastfeeding No   BMI 26.07 kg/m²     "

## 2025-01-08 NOTE — DISCHARGE INSTRUCTIONS
Surgery Post Op Instructions:    You had a robotic cholecystectomy performed today.     Wound care:  Your incision is covered with Dermabond, a type of surgical super glue. You may shower tomorrow - let soapy water run over the incision but do not scrub the area. You must avoid submerging the incision in pools, bathtubs, etc until it is fully healed in 4-6 weeks.     You need to limit yourself to light duty activities (no lifting/pulling/pushing >10 lbs) for a total of 6 weeks after surgery.    No dietary restrictions.    Medications:  A narcotic pain medication has been prescribed.  Please start to wean the pain medication over the following few days.  You can take tylenol instead of the pain Take tylenol and ibuprofen around the clock for pain.     Follow up:  Return to clinic in 2 weeks for follow up and wound check.

## 2025-01-08 NOTE — CONSULTS
[facility]  General Surgery  Consult Note    Patient Name: Letty Hillman  MRN: [id number]  Code Status: Full Code  Admission Date: 1/7/2025  Hospital Length of Stay: 0 days  Attending Physician: Len Domínguez MD  Primary Care Provider: Nikki, Primary Doctor      Consults  Subjective:     Principal Problem: <principal problem not specified>    History of Present Illness: Letty Hillman is a 24 y.o. female  with no PMHx who has recent vaginal delivery 20 days ago who presented to the ED yesterday with a few hours of RUQ abdominal pain that radiated to the back. She had never had similar symptoms in the past. US showed cholelithiasis, but no evidence for acute cholecystitis. With supportive care, her symptoms failed to resolve. She was placed in observation overnight. This morning her symptoms are mildly improved, but she desires to go ahead with surgery. WBC normal, afebrile. VSS.    No current facility-administered medications on file prior to encounter.     Current Outpatient Medications on File Prior to Encounter   Medication Sig    ampicillin (PRINCIPEN) 500 MG capsule Take 1 capsule (500 mg total) by mouth every 6 (six) hours.    ibuprofen (ADVIL,MOTRIN) 800 MG tablet Take 1 tablet (800 mg total) by mouth 3 (three) times daily as needed for Pain.    PNV 11-iron fum-folic acid-om3 28 mg iron-1 mg -200 mg Cap Take 1 tablet by mouth once daily.       Review of patient's allergies indicates:  No Known Allergies    History reviewed. No pertinent past medical history.  History reviewed. No pertinent surgical history.  Family History    None       Tobacco Use    Smoking status: Never    Smokeless tobacco: Never   Substance and Sexual Activity    Alcohol use: Not Currently    Drug use: Not Currently    Sexual activity: Yes     Partners: Male     Review of Systems   All other systems reviewed and are negative.    Objective:     Vital Signs (Most Recent):  Temp: 98.2 °F (36.8 °C)  (01/08/25 0745)  Pulse: 73 (01/08/25 0745)  Resp: 17 (01/08/25 0745)  BP: 120/73 (01/08/25 0745)  SpO2: 98 % (01/08/25 0745) Vital Signs (24h Range):  Temp:  [98 °F (36.7 °C)-98.3 °F (36.8 °C)] 98.2 °F (36.8 °C)  Pulse:  [73-97] 73  Resp:  [17-20] 17  SpO2:  [98 %-100 %] 98 %  BP: (120-134)/(73-85) 120/73     Weight: 62.6 kg (138 lb)  Body mass index is 26.07 kg/m².     Physical Exam  Constitutional:       General: She is not in acute distress.     Appearance: Normal appearance.   HENT:      Head: Normocephalic and atraumatic.      Nose: Nose normal.      Mouth/Throat:      Mouth: Mucous membranes are moist.   Eyes:      Extraocular Movements: Extraocular movements intact.   Cardiovascular:      Rate and Rhythm: Normal rate.   Pulmonary:      Effort: Pulmonary effort is normal. No respiratory distress.   Abdominal:      Palpations: Abdomen is soft.      Comments: Mildly TTP   Musculoskeletal:         General: Normal range of motion.      Cervical back: Normal range of motion.   Skin:     General: Skin is warm.      Coloration: Skin is not jaundiced.   Neurological:      General: No focal deficit present.      Mental Status: She is alert and oriented to person, place, and time.            I have reviewed all pertinent lab results within the past 24 hours.    Significant Diagnostics:  I have reviewed all pertinent imaging results/findings within the past 24 hours.    Assessment/Plan:     Gallstones  Letty Hillman is a 24 y.o. female  with no PMHx who presents to the ED with symptomatic cholelithiasis. Symptoms are not resolving.    - to OR today for robotic elizabeth  - consent obtained  - MM pain control  - NPO until after OR      VTE Risk Mitigation (From admission, onward)           Ordered     IP VTE LOW RISK PATIENT  Once         01/08/25 0046     Place SELWYN hose  Until discontinued         01/08/25 0046                    Kandace Carrasquillo MD  General Surgery  [facility]    Pt seen and  examined.  Agree with note and plan.    Len Domínguez M.D., F.A.CSuzeS.  Ibpubv-Djltzkwry-Rcekqrc and General Surgery  [facility] [place] [facility] [place]

## 2025-01-08 NOTE — HPI
Letty Hillman is a 24 y.o. female  with no PMHx who has recent vaginal delivery 20 days ago who presented to the ED yesterday with a few hours of RUQ abdominal pain that radiated to the back. She had never had similar symptoms in the past. US showed cholelithiasis, but no evidence for acute cholecystitis. With supportive care, her symptoms failed to resolve. She was placed in observation overnight. This morning her symptoms are mildly improved, but she desires to go ahead with surgery. WBC normal, afebrile. VSS.

## 2025-01-08 NOTE — OP NOTE
PATIENT: Letty Hillman    MRN: [id number]    DATE OF PROCEDURE: 01/08/2025    PREOPERATIVE DIAGNOSIS: Acute cholecystitis    POSTOPERATIVE DIAGNOSIS: same    PROCEDURE: Robotic cholecystectomy with Intraoperative cholangiography    SURGEON: Len Domínguez M.D.    ASSISTANT: Kandace Berrios M.D.    ANESTHESIA: General Endotracheal    ESTIMATED BLOOD LOSS: minimal    SPECIMEN: Gallbladder and contents    COMPLICATIONS: None    INDICATIONS: The patient is 23 yo. R/B/A to Robotic cholecystectomy surgery were explained to the patient in detail.  The risks include, but are not limited to: bleeding, infection, re-operation, injury to surrounding structures,reaction to anesthesia, tenzin-operative cardiopulmonary events including PE/DVT, bile leak, post-op diarrhea, failure to resolve symptoms, and possible death.  The patient stated a clear understanding of the risks and requests the procedure be done.    PROCEDURE IN DETAIL:  After surgical consent was obtained, the patient was transported to the operative theater and onto the operating room table in a supine position.  General endotracheal anesthesia was administered without difficulty.  The patient's abdomen was prepped and draped in a standard sterile fashion.  Appropriate perioperative antibiotics were given and a time-out was performed.  An incision was made in the periumbilical region and the fascia was elevated after blunt dissection.  It was sharply divided and the abdominal cavity was entered bluntly.  A trocar was inserted in the abdomen and it was insufflated.  Additional robotic trocars were inserted without difficulty and under direct visualization and the robot was docked.  The gallbladder was noted to be inflamed.  The dome of the gallbladder was identified and elevated anteriorly and towards the patient's right shoulder.  The peritoneum was scored on both sides of the infundibulum and a critical view of safety was obtained.  ICG/Firefly  technology was used for cholangiography.  The cystic duct and common bile duct were illuminated and their anatomical relationship confirmed.  The cystic duct and cystic artery were the only structures seen entering the gallbladder.  They were clipped and divided.  The gallbladder was then taken off the liver bed using cautery.  The liver bed was found to be free of bleeding or bile leakage.  The robot was undocked and the gallbladder was placed in a specimen bag and removed from the field without difficulty. The trocars were removed under direct visualization and the abdomen was desufflated.  The fascial defect at the umbilicus was closed using a figure-of-eight 0 Vicryl suture.  Incisions were all irrigated out with sterile saline and injected with local anesthetic.  The skin openings were closed with interrupted 4 0 Monocryl suture and the incisions were all dressed in a standard sterile fashion.  At the end of the procedure the instrument, lap, and needle counts were all correct.  The patient was awoken from anesthesia having tolerated the procedure without difficulty and was returned to the PACU in a stable condition.  Patient's family was updated at the end of the procedure and all questions were answered.      Len Domínguez M.D., F.A.C.S.  [facility]Fttx[facility] and General Surgery  [facility]

## 2025-01-08 NOTE — NURSING
0035 received this patient from ER, with intravenous fluid of LR at 100mlhr attached at left antecubital vein patent and infusing well, patient stated her pain 5/10, located at right upper quadrant of the abdomen. Plan of care reviewed with patient using  (Zachary #[id number]) and verbalized understanding. Voids spontaneously, on NPO as ordered. Reports pain relieved with hot packs. VSS.

## 2025-01-08 NOTE — PLAN OF CARE
SOCIAL WORK DISCHARGE PLANNING ASSESSMENT    SW completed discharge planning assessment. Pt is a readmit. Pt resides with her significant other Rafael ([phone]) and children. Pt has minimal support from family and friends but Rafael will provide assistance as needed after returning home. Pt has no DME and is not current with  services. Rafael will provide transportation home following discharge. No needs for community resources were reported.     Patient Active Problem List   Diagnosis    Pre-eclampsia in third trimester    Pregnancy     (spontaneous vaginal delivery)      25 1030   Discharge Planning   Assessment Type Discharge Planning Brief Assessment   Resource/Environmental Concerns none   Support Systems Spouse/significant other  (pt's significant other Rafael [phone])   Equipment Currently Used at Home none   Current Living Arrangements home   Patient/Family Anticipates Transition to home;home with family   Patient/Family Anticipated Services at Transition none   DME Needed Upon Discharge    (TBD)   Discharge Plan A Home;Home with family   Physical Activity   On average, how many days per week do you engage in moderate to strenuous exercise (like a brisk walk)? Pt Unable   On average, how many minutes do you engage in exercise at this level? Pt Unable   Financial Resource Strain   How hard is it for you to pay for the very basics like food, housing, medical care, and heating? Pt Unable   Housing Stability   In the last 12 months, was there a time when you were not able to pay the mortgage or rent on time? Pt Unable   At any time in the past 12 months, were you homeless or living in a shelter (including now)? Pt Unable   Transportation Needs   Has the lack of transportation kept you from medical appointments, meetings, work or from getting things needed for daily living? Patient unable to answer   Food Insecurity   Within the past 12 months, you worried that your food would run out  before you got the money to buy more. Pt Unable   Within the past 12 months, the food you bought just didn't last and you didn't have money to get more. Pt Unable   Stress   Do you feel stress - tense, restless, nervous, or anxious, or unable to sleep at night because your mind is troubled all the time - these days? Pt Unable   Social Isolation   How often do you feel lonely or isolated from those around you?  Patient unable to answer   Alcohol Use   Q1: How often do you have a drink containing alcohol? Pt Unable   Q2: How many drinks containing alcohol do you have on a typical day when you are drinking? Pt Unable   Q3: How often do you have six or more drinks on one occasion? Pt Unable   Utilities   In the past 12 months has the electric, gas, oil, or water company threatened to shut off services in your home? Pt Unable   Health Literacy   How often do you need to have someone help you when you read instructions, pamphlets, or other written material from your doctor or pharmacy? Patient unable to respond

## 2025-01-08 NOTE — PROGRESS NOTES
11:00- Patient leaving unit via stretcher for surgery at this time. All personal belongings left with significant other.

## 2025-01-08 NOTE — ASSESSMENT & PLAN NOTE
Letty Hillman is a 24 y.o. female  with no PMHx who presents to the ED with symptomatic cholelithiasis. Symptoms are not resolving.    - to OR today for robotic elizabeth  - consent obtained  - MM pain control  - NPO until after OR

## 2025-01-08 NOTE — ANESTHESIA PROCEDURE NOTES
Intubation    Date/Time: 1/8/2025 11:27 AM    Performed by: Sarah Espinoza CRNA  Authorized by: Roes Pinto MD    Intubation:     Induction:  Intravenous    Intubated:  Postinduction    Mask Ventilation:  Easy mask    Attempts:  1    Attempted By:  Student    Method of Intubation:  Video laryngoscopy    Blade:  Palm 3    Laryngeal View Grade: Grade I - full view of cords      Difficult Airway Encountered?: No      Complications:  None    Airway Device:  Oral endotracheal tube    Airway Device Size:  7.0    Style/Cuff Inflation:  Cuffed (inflated to minimal occlusive pressure)    Tube secured:  21    Secured at:  The lips    Placement Verified By:  Capnometry    Complicating Factors:  None    Findings Post-Intubation:  BS equal bilateral and atraumatic/condition of teeth unchanged       Admission

## 2025-01-08 NOTE — ANESTHESIA PREPROCEDURE EVALUATION
2025  Letty Hillman is a 24 y.o., female.  Patient Active Problem List   Diagnosis    Pre-eclampsia in third trimester    Pregnancy     (spontaneous vaginal delivery)   History reviewed. No pertinent surgical history.  Pre-op Assessment       I have reviewed the Medications.     Review of Systems  Anesthesia Hx:             Denies Family Hx of Anesthesia complications.     Cardiovascular:     Hypertension                                              Physical Exam  General: Well nourished    Airway:  Mallampati: II   TM Distance: Normal  Neck ROM: Normal ROM    Dental:  Intact    Chest/Lungs:  Clear to auscultation    Heart:  Rate: Normal  Rhythm: Regular Rhythm        Anesthesia Plan  Type of Anesthesia, risks & benefits discussed:    Anesthesia Type: Gen ETT  Intra-op Monitoring Plan: Standard ASA Monitors  Post Op Pain Control Plan: multimodal analgesia  Induction:  IV  Informed Consent: Informed consent signed with the Patient and all parties understand the risks and agree with anesthesia plan.  All questions answered.   ASA Score: 2    Ready For Surgery From Anesthesia Perspective.     .

## 2025-01-08 NOTE — PLAN OF CARE
General Surgery Plan of Care    Formal H&P to follow.    Letty Hillman is a 24 y.o. female  with no PMH who presented to the ED today with a few hours of persistent abdominal pain. Afebrile, no leukocytosis. US with cholelithiasis, but no evidence of cholecystitis. Pain has not resolved with supportive care in the ED.     - place in observation  - AM labs  - NPO, mIVF tonight  - will reassess in the morning for symptom resolution  - discuss inpatient surgical intervention vs follow up in clinic    Kandace Carrasquillo MD  [facility]  General Surgery PGY-2

## 2025-01-08 NOTE — ANESTHESIA POSTPROCEDURE EVALUATION
Anesthesia Post Evaluation    Patient: Letty Hillman    Procedure(s) Performed: Procedure(s) (LRB):  ROBOTIC CHOLECYSTECTOMY (N/A)    Final Anesthesia Type: general      Patient location during evaluation: PACU  Patient participation: Yes- Able to Participate  Level of consciousness: awake and alert and oriented  Post-procedure vital signs: reviewed and stable  Pain management: adequate  Airway patency: patent  RODGER mitigation strategies: Multimodal analgesia, Extubation while patient is awake and Verification of full reversal of neuromuscular block  PONV status at discharge: No PONV  Anesthetic complications: no      Cardiovascular status: hemodynamically stable  Respiratory status: spontaneous ventilation and unassisted  Hydration status: euvolemic  Follow-up not needed.              Vitals Value Taken Time   /81 01/08/25 1427   Temp 36.8 °C (98.2 °F) 01/08/25 1427   Pulse 108 01/08/25 1427   Resp 18 01/08/25 1427   SpO2 98 % 01/08/25 1427         Event Time   Out of Recovery 01/08/2025 14:10:55         Pain/Marciano Score: Pain Rating Prior to Med Admin: 8 (1/8/2025  1:23 PM)  Pain Rating Post Med Admin: 6 (1/7/2025  6:34 PM)  Marciano Score: 10 (1/8/2025  2:00 PM)

## 2025-01-08 NOTE — H&P
Please see consult note dated 1/8/24.    Kandace Carrasquillo MD  [facility]  General Surgery PGY-2      Pt seen and examined.  Agree with note and plan.    Len Domínguez M.D., F.A.C.S.  [facility] and General Surgery  [facility]

## 2025-01-08 NOTE — ED PROVIDER NOTES
Encounter Date: 1/7/2025       History     Chief Complaint   Patient presents with    Abdominal Pain     Pt presents c/o epigastric abd pain that started about 45 minutes ago, denies N/V/D, pt is 1 month postpartum.      24-year-old female with no known medical problems who is 20 days postpartum presents complaining of epigastric and right upper quadrant abdominal pain radiating to the back starting an hour prior to arrival.  Pain has been constant.  No pain with breathing.  No shortness a breath.  No fever.  No urinary symptoms.  Denies feeling this type of pain before.  Patient is breastfeeding.      Review of patient's allergies indicates:  No Known Allergies  History reviewed. No pertinent past medical history.  History reviewed. No pertinent surgical history.  No family history on file.  Social History     Tobacco Use    Smoking status: Never    Smokeless tobacco: Never   Substance Use Topics    Alcohol use: Not Currently    Drug use: Not Currently     Review of Systems   Constitutional:  Negative for fever.   HENT:  Negative for sore throat.    Eyes:  Negative for visual disturbance.   Respiratory:  Negative for shortness of breath.    Cardiovascular:  Negative for chest pain.   Gastrointestinal:  Positive for abdominal pain.   Genitourinary:  Negative for difficulty urinating.   Skin:  Negative for rash.   Neurological:  Negative for headaches.       Physical Exam     Initial Vitals [01/07/25 1636]   BP Pulse Resp Temp SpO2   127/81 97 20 98.3 °F (36.8 °C) 100 %      MAP       --         Physical Exam    Nursing note and vitals reviewed.  Constitutional: She appears well-developed and well-nourished.   Eyes: EOM are normal. Pupils are equal, round, and reactive to light.   Neck: Neck supple. No thyromegaly present. No JVD present.   Normal range of motion.  Cardiovascular:  Normal rate, regular rhythm, normal heart sounds and intact distal pulses.     Exam reveals no gallop and no friction rub.       No  murmur heard.  Pulmonary/Chest: Breath sounds normal. No respiratory distress.   Abdominal: Abdomen is soft. There is abdominal tenderness.   No CVA tenderness.  No Ortega sign. There is no rebound and no guarding.   Musculoskeletal:         General: No tenderness or edema. Normal range of motion.      Cervical back: Normal range of motion and neck supple.     Neurological: She is alert and oriented to person, place, and time. She has normal strength.   Skin: Skin is warm and dry.         ED Course   Procedures  Labs Reviewed   CBC W/ AUTO DIFFERENTIAL - Abnormal       Result Value    WBC 11.04      RBC 4.34      Hemoglobin 11.2 (*)     Hematocrit 34.5 (*)     MCV 80 (*)     MCH 25.8 (*)     MCHC 32.5      RDW 16.0 (*)     Platelets 359      MPV 9.2      Immature Granulocytes 0.3      Gran # (ANC) 9.0 (*)     Immature Grans (Abs) 0.03      Lymph # 1.4      Mono # 0.6      Eos # 0.1      Baso # 0.04      nRBC 0      Gran % 81.0 (*)     Lymph % 12.4 (*)     Mono % 5.4      Eosinophil % 0.5      Basophil % 0.4      Differential Method Automated     COMPREHENSIVE METABOLIC PANEL - Abnormal    Sodium 135 (*)     Potassium 3.4 (*)     Chloride 108      CO2 16 (*)     Glucose 96      BUN 8      Creatinine 0.7      Calcium 8.8      Total Protein 7.6      Albumin 3.7      Total Bilirubin 0.8      Alkaline Phosphatase 214 (*)     AST 90 (*)     ALT 78 (*)     eGFR >60      Anion Gap 11     URINALYSIS, REFLEX TO URINE CULTURE - Abnormal    Specimen UA Urine, Clean Catch      Color, UA Colorless (*)     Appearance, UA Clear      pH, UA 6.0      Specific Gravity, UA 1.005      Protein, UA Negative      Glucose, UA Negative      Ketones, UA Negative      Bilirubin (UA) Negative      Occult Blood UA 2+ (*)     Nitrite, UA Negative      Urobilinogen, UA Negative      Leukocytes, UA 3+ (*)     Narrative:     Specimen Source->Urine   URINALYSIS MICROSCOPIC - Abnormal    RBC, UA 9 (*)     WBC, UA 51 (*)     Bacteria Rare      Squam  Epithel, UA 0      Microscopic Comment SEE COMMENT      Narrative:     Specimen Source->Urine   CULTURE, URINE   LIPASE    Lipase 41     POCT URINE PREGNANCY    POC Preg Test, Ur Negative       Acceptable Yes            Imaging Results              US Abdomen Limited (Final result)  Result time 01/07/25 19:56:00      Final result by Ousmane Crawford DO (01/07/25 19:56:00)                   Impression:      Cholelithiasis without evidence of acute cholecystitis.      Electronically signed by: Ousmane Crawford  Date:    01/07/2025  Time:    19:56               Narrative:    EXAMINATION:  US ABDOMEN LIMITED    CLINICAL HISTORY:  Abdominal Pain - Gallbladder;    TECHNIQUE:  Limited ultrasound of the right upper quadrant of the abdomen (including pancreas, liver, gallbladder, common bile duct, and spleen) was performed.    COMPARISON:  None.    FINDINGS:  Liver: Normal in size, measuring 14.3 cm. Hepatic parenchyma is unremarkable.  No focal hepatic lesions.    Gallbladder: There is gallbladder sludge and there are stones, the largest of which measures 2.5 mm.  Sonographic Ortega sign is positive.  There is no gallbladder wall thickening.  There is no gallbladder wall hyperemia or pericholecystic fluid.    Biliary system: The common duct is not dilated, measuring 3 mm.  No intrahepatic ductal dilatation.    Spleen: Normal in size and echotexture, measuring 10.5 x 5.2 cm.    Pancreas: Unremarkable.    IVC: Unremarkable.    Miscellaneous: No upper abdominal ascites.                                       Medications   sodium chloride 0.9% bolus 1,000 mL 1,000 mL (0 mLs Intravenous Stopped 1/7/25 1957)   morphine injection 4 mg (4 mg Intravenous Given 1/7/25 1804)   ondansetron injection 4 mg (4 mg Intravenous Given 1/7/25 1804)   aluminum-magnesium hydroxide-simethicone 200-200-20 mg/5 mL suspension 30 mL (30 mLs Oral Given 1/7/25 1802)     And   LIDOcaine viscous HCl 2% oral solution 15 mL (15 mLs Oral Given  1/7/25 1806)   piperacillin-tazobactam (ZOSYN) 4.5 g in D5W 100 mL IVPB (MB+) (4.5 g Intravenous New Bag 1/7/25 1958)     Medical Decision Making  Amount and/or Complexity of Data Reviewed  Labs: ordered.  Radiology: ordered.    Risk  OTC drugs.  Prescription drug management.    White blood cell count 11 with left shift of 81 granulocytes.  Patient does have some LFT elevations but this appears to be ongoing for several weeks.  Patient's urinalysis is abnormal with 51 white blood cells 9 red blood cells.  Denied urinary symptoms.  No CVA tenderness.  No lower abdominal tenderness.  Right upper quadrant ultrasound shows gallstones.  Ultrasound tech documented positive sonographic Ortega sign.  No other signs of acute cholecystitis.  Discussed the case with General surgery on-call.  Per General surgery the positive sonographic Ortega sign does not mean much.  We discussed patient's still having 5/10 pain.  Per surgery patient can self observe at home if she feels comfortable with that.  Or they can observe overnight in the hospital and reassess her in the morning.  I discussed through  CARLOS # [id number].  Patient and significant other prefer to be observed overnight in the hospital.  Orders placed.                                  Clinical Impression:  Final diagnoses:  [R10.11] RUQ abdominal pain (Primary)  [K80.20] Gallstones  [N39.0] Urinary tract infection without hematuria, site unspecified          ED Disposition Condition    Observation Stable                Dougie Wolfe MD  01/07/25 0011

## 2025-01-08 NOTE — PLAN OF CARE
Discharge instructions given to patient and significant other verbally and in writing with [facility]N  used at the bedside. Instructed patient on reasons to come back to the hospital. Patient was able to verbalize understanding of all instructions given. Transport requested for patient and wheelchair to pick her up shortly.

## 2025-01-10 LAB
FINAL PATHOLOGIC DIAGNOSIS: NORMAL
GROSS: NORMAL
Lab: NORMAL

## 2025-01-13 NOTE — DISCHARGE SUMMARY
[facility]  General Surgery  Discharge Summary      Patient Name: Letty Hillman  MRN: [id number]  Admission Date: 1/7/2025  Hospital Length of Stay: 0 days  Discharge Date and Time: 1/8/2025  4:53 PM  Attending Physician: Nikki att. providers found   Discharging Provider: Sangita Blake MD  Primary Care Provider: Nikki, Primary Doctor    HPI:   Letty Hillman is a 24 y.o. female  with no PMHx who has recent vaginal delivery 20 days ago who presented to the ED yesterday with a few hours of RUQ abdominal pain that radiated to the back. She had never had similar symptoms in the past. US showed cholelithiasis, but no evidence for acute cholecystitis. With supportive care, her symptoms failed to resolve. She was placed in observation overnight. This morning her symptoms are mildly improved, but she desires to go ahead with surgery. WBC normal, afebrile. VSS.    Procedure(s) (LRB):  ROBOTIC CHOLECYSTECTOMY (N/A)      Indwelling Lines/Drains at time of discharge:   Lines/Drains/Airways       None                 Hospital Course: Admitted for acute cholecystitis. Taken to the OR for cholecystectomy. The procedure was performed without complication and she was transferred to the floor for further post op care and monitoring. Her postoperative course was uneventful and she progressed according to plan.  Her pain was controlled with a combination of IV and PO multimodal pain medications. Her diet has been advanced throughout her hospital stay. She is now ambulating without assistance, tolerating a regular diet, pain is well controlled with PO pain medication, and she is voiding appropriately. She now meets all criteria for discharge.             Goals of Care Treatment Preferences:  Code Status: Full Code      Consults:     Significant Diagnostic Studies: N/A    Pending Diagnostic Studies:       None          Final Active Diagnoses:    Diagnosis Date Noted POA    PRINCIPAL PROBLEM:  Gallstones  [K80.20] 01/08/2025 Yes      Problems Resolved During this Admission:      Discharged Condition: good    Disposition: Home or Self Care    Follow Up:   Follow-up Information       Len Domínguez MD Follow up in 2 week(s).    Specialty: General Surgery  Contact information:  [address]  [address]  [place] [address]  [phone]                           Patient Instructions:      Diet Adult Regular     Notify your health care provider if you experience any of the following:  temperature >100.4     Notify your health care provider if you experience any of the following:  persistent nausea and vomiting or diarrhea     Notify your health care provider if you experience any of the following:  severe uncontrolled pain     Notify your health care provider if you experience any of the following:  redness, tenderness, or signs of infection (pain, swelling, redness, odor or green/yellow discharge around incision site)     Activity as tolerated     Medications:  Reconciled Home Medications:      Medication List        START taking these medications      acetaminophen 325 MG tablet  Commonly known as: TYLENOL  Take 2 tablets (650 mg total) by mouth every 6 (six) hours as needed for Pain.            CONTINUE taking these medications      ampicillin 500 MG capsule  Commonly known as: PRINCIPEN  [place] 1 tableta (500 mg en total) por vía oral cada 6 (seis) horas.  (Take 1 capsule (500 mg total) by mouth every 6 (six) hours.)     ibuprofen 800 MG tablet  Commonly known as: ADVIL,MOTRIN  [place] 1 tableta (800 mg en total) por vía oral 3 (polo) veces al día según sea necesario para el dolor.  (Take 1 tablet (800 mg total) by mouth 3 (three) times daily as needed for Pain.)     PNV 11-iron fum-folic acid-om3 28 mg iron-1 mg -200 mg Cap  [place] kayley tableta por vía oral diariamente.  (Take 1 tablet by mouth once daily.)            Time spent on the discharge of patient: 34 minutes    Sangita Blake MD  General Surgery  [facility]  [facility]   Never

## 2025-01-13 NOTE — HOSPITAL COURSE
Admitted for acute cholecystitis. Taken to the OR for cholecystectomy. The procedure was performed without complication and she was transferred to the floor for further post op care and monitoring. Her postoperative course was uneventful and she progressed according to plan.  Her pain was controlled with a combination of IV and PO multimodal pain medications. Her diet has been advanced throughout her hospital stay. She is now ambulating without assistance, tolerating a regular diet, pain is well controlled with PO pain medication, and she is voiding appropriately. She now meets all criteria for discharge.

## 2025-02-03 NOTE — NURSING
ID# [id number]  Orders to start Pitocin after one time dose of vaginal cytotec (given around midnight)  Pt cervix slightly changed;  Discussed poc with pt using video   Explained importance of frequent position changes; pt verbalized understanding (some learning may need reinforcement)  Questions answered; no other needs at this time.    
"1915 - see Pt profile for  ID # "Stephanie"    Assumed care of Ms. Albania Hillman. SO at bedside.  Pt denies ctx pain, slight HA 3-4/10; has orders for tylenol PRN. Otherwise comfortable  Afebrile and VS wnl; mild range BPs noted today  Ms. Block has had an epidural since last night; infusion near end. BABS Mims to bedside for bag change.  SCDs placed d/t prolonged immobility;able to move BLEs;   Ramesh draining; irreg ctx mild to palpation, reactive strip  Reviewed poc to remove cervidil at 2200 & recheck cervix. Will update Dr. Caballero on call at that time.  Pt tolerating clear liquids;  questions encouraged and answered; no other needs at present   "
1610pm=  Admitted to unit, accompanied by L&D RN and significant other.  Resp even and unlabored.  Assessment per flowsheet.  POC reviewed and instructed to call nurse to get OOB.  Verbalized understanding.  Voices no complaints at present.  Resting quietly with no distress.  Safety maintained with call light in reach.   
3rd dose of cytotec given at 0220. Contractions every 2-4 minutes.  
Cervidil still in place.   linda q 2-4  min.  Patient resting comfortably. Epidural and hogan in place.  Report given to Kenyetta ALCALA night shift LR running at 50 ml/hr.  Hogan emptied  
Cervix 1/30/-3 and not soft. Contractions every 1-3 minutes even after fluid bolus. 3rd dose of cytotec held for the moment.  
Dr Caballero advised the patient is linda from 2-4 mins so I held the cytotec.  The patient is not making any change in her cervix.  1/40/-3.  Orders placed for Cervidil.   
Dr Caballero reviewed the FH strip and is aware the patient's cervix is 6/70-80/-1.  Orders for amnio-infusion started  
Dr. Shelley updated on patient's status. Cervix 1/40/-3.  
I rolled the patient to the restroom using the sarasteady.  Patient urinated 650 ml of urine without difficulty.  New underwear and a pad was placed on the patient.  Patient was rolled to room 303 by wheelchair.  Report was given to Arielle RN on mother baby.  Fundus was firm 1 cm below the umbilicus midline without massage. Lochia was rubra scant.    
MMR and Flu vaccine indicated for this patient prior to discharge. Vis given and educated pt using amn  Jodee #[id number]. Pt agreeable to vaccines. Questions encouraged and answered.  
Patient c/o abdominal/pelvic pain. Mao BRICE at the bedside  
Patient resting comfortably in room. Epidural and hogan in place. 2nd dose of cytotec given.  
Patient was sent to labor and delivery by Dr Thorne for high B/P and a high PC ratio.  Orders placed.  # [id number] Jed was used to communicate with the patient.  24 year old G 1 P 0 at 38.2 wks.   Patient denies any vaginal bleeding, or leaking of vaginal fluid. Fetus: fetal heart tones 142, positive fetal movements. Contractions one noted on the FHM. Abdomen soft non-tender. Vital signs 170/90 will recheck in 15 min. Patient denies any headache, visual disturbance or epigastric pain. .Educated on electronic fetal monitoring and assessments. Questions answered. Will update Dr. Thorne.    
Per the patient her legs are still feeling heavy and weak.  She is able to move them some.  Patient advised I will come back and check on her in a little bit to see if she is able to get up to the restroom.  Patient was advised not to get up without me due to her legs being weak and she could fall.  Patient and her  voiced understanding. Fundus is firm 1 cm below the umbilicus midline. Lochia is rubra scant.   
Report given to Loraine ALCALA night shift.  Patient linda Q 2 min.  saline lock in place. No leaking of vaginal fluids or vaginal bleeding at this time.   
Report received from FREDRICK Mckeon. Patient resting in room. Reports contraction pain the ranges from 5 to 8 out of 10. Requested epidural. Bolus started. IV flushed and fluid bolus started. CRNA notified. Next cervical check at 2000. Vitals WNLs.  
Report received from Kenyetta ALCALA night shift. 24 year old G 1 P 0 at 38.4 wks  here for IOL due to Pre-e without SF.  no vaginal bleeding, or leaking of vaginal fluid. Fetus: fetal heart tones 129, positive fetal movements. Contractions Q 2 min. Abdomen soft non-tender. Vital signs 126/71 Cervix: 1.5/50/-2 per Kenyetta ALCALA .Educated on electronic fetal monitoring and assessments. Questions answered. Will update Dr. Caballero.    
Report received from Loraine ALCALA night shift.  24 year old G 1 P 0 at 38.3 wks here for IOL due to pre-e without severe features.   no vaginal bleeding, or leaking of fluid. Fetus: fetal heart tones 132, positive fetal movements. Contractions  Q 4-2 min. Abdomen soft non-tender. Vital signs WNL  Cervix: 1/40/-3 per Loraine ALCALA.  LR noted to be running at 75 ml. Ramesh catheter and epidural in place. Loraine ALCALA gave the patient Nalbuphine at 0650 due to the patient itching. Educated on electronic fetal monitoring and assessments. Questions answered. Will update Dr. Caballero.    
U/S tech still at the bedside  
Written discharge instructions reviewed and explained to patient using [facility] #[id number]. Questions encouraged and answered. Pt verbalized understanding.   
[FreeTextEntry1] : Available notes, and pertinent labs & imaging in EMR reviewed. Pertinent labs and imaging summarized in HPI.

## (undated) DEVICE — KIT AIRSEAL CANN CAP STD 8MM

## (undated) DEVICE — COVER LIGHT HANDLE 80/CA

## (undated) DEVICE — KIT AIRSEAL BIFURCT FLTR TB

## (undated) DEVICE — ADHESIVE DERMABOND ADVANCED

## (undated) DEVICE — SUT VICRYL+ 27 UR-6 VIOL

## (undated) DEVICE — ELECTRODE REM PLYHSV RETURN 9

## (undated) DEVICE — TUBING INSUFFLATION W/LUER LOK

## (undated) DEVICE — STAPLER SKIN ROTATING HEAD

## (undated) DEVICE — COVER TIP CURVED SCISSORS XI

## (undated) DEVICE — DRAPE ARM DAVINCI XI

## (undated) DEVICE — NDL HYPO REG 25G X 1 1/2

## (undated) DEVICE — TROCAR ENDOPATH XCEL 11MM 10CM

## (undated) DEVICE — SEAL UNIVERSAL 5MM-8MM XI

## (undated) DEVICE — PAD PINK TRENDELENBURG POS XL

## (undated) DEVICE — BAG TISSUE RETRIEVAL 225ML

## (undated) DEVICE — DRAPE COLUMN DAVINCI XI

## (undated) DEVICE — CLIP HEMO-LOK ML

## (undated) DEVICE — Device

## (undated) DEVICE — GLOVE SURGICAL LATEX SZ 8

## (undated) DEVICE — MANIFOLD 4 PORT

## (undated) DEVICE — COVER MAYO STND XL 30X57IN

## (undated) DEVICE — SUT MCRYL PLUS 4-0 PS2 27IN

## (undated) DEVICE — OBTURATOR BLADELESS 8MM XI CLR

## (undated) DEVICE — SYS SEE SHARP SCOPE ANTIFOG